# Patient Record
Sex: FEMALE | Race: WHITE | Employment: PART TIME | URBAN - METROPOLITAN AREA
[De-identification: names, ages, dates, MRNs, and addresses within clinical notes are randomized per-mention and may not be internally consistent; named-entity substitution may affect disease eponyms.]

---

## 2018-07-02 PROBLEM — G25.81 RLS (RESTLESS LEGS SYNDROME): Status: ACTIVE | Noted: 2018-07-02

## 2018-09-23 ENCOUNTER — APPOINTMENT (OUTPATIENT)
Dept: CT IMAGING | Age: 53
End: 2018-09-23
Attending: EMERGENCY MEDICINE
Payer: COMMERCIAL

## 2018-09-23 ENCOUNTER — HOSPITAL ENCOUNTER (EMERGENCY)
Age: 53
Discharge: HOME | End: 2018-09-23
Attending: EMERGENCY MEDICINE
Payer: COMMERCIAL

## 2018-09-23 VITALS
SYSTOLIC BLOOD PRESSURE: 138 MMHG | HEART RATE: 72 BPM | HEIGHT: 68 IN | WEIGHT: 170 LBS | OXYGEN SATURATION: 98 % | RESPIRATION RATE: 17 BRPM | DIASTOLIC BLOOD PRESSURE: 79 MMHG | TEMPERATURE: 98.2 F | BODY MASS INDEX: 25.76 KG/M2

## 2018-09-23 DIAGNOSIS — R51.9 ACUTE NONINTRACTABLE HEADACHE, UNSPECIFIED HEADACHE TYPE: Primary | ICD-10-CM

## 2018-09-23 PROCEDURE — 99283 EMERGENCY DEPT VISIT LOW MDM: CPT | Performed by: EMERGENCY MEDICINE

## 2018-09-23 PROCEDURE — 74011250637 HC RX REV CODE- 250/637: Performed by: EMERGENCY MEDICINE

## 2018-09-23 PROCEDURE — 70450 CT HEAD/BRAIN W/O DYE: CPT

## 2018-09-23 RX ORDER — HYDROCODONE BITARTRATE AND ACETAMINOPHEN 5; 325 MG/1; MG/1
1 TABLET ORAL ONCE
Status: COMPLETED | OUTPATIENT
Start: 2018-09-23 | End: 2018-09-23

## 2018-09-23 RX ADMIN — HYDROCODONE BITARTRATE AND ACETAMINOPHEN 1 TABLET: 5; 325 TABLET ORAL at 21:51

## 2018-09-24 NOTE — ED NOTES
I have reviewed discharge instructions with the patient and parent. The patient verbalized understanding. Patient left ED via Discharge Method: ambulatory to Home with family. Opportunity for questions and clarification provided. Patient given 0 scripts. To continue your aftercare when you leave the hospital, you may receive an automated call from our care team to check in on how you are doing. This is a free service and part of our promise to provide the best care and service to meet your aftercare needs.  If you have questions, or wish to unsubscribe from this service please call [phone]. Thank you for Choosing our [facility] Emergency Department.

## 2018-09-24 NOTE — DISCHARGE INSTRUCTIONS

## 2018-09-24 NOTE — ED PROVIDER NOTES
HPI Comments: Patient is a 42-year-old female with history of Crohn's disease who comes to the ER today complaining of a headache for the past 2 days. She denies any trauma or injury. She states it started out frontal headache. Then was moved to the back in the occipital area. She has had no relief with Tylenol. She denies any fever. She denies any numbness or weakness. Patient is a 47 y.o. female presenting with headaches. The history is provided by the patient. Headache This is a new problem. The current episode started 2 days ago. The problem occurs constantly. The problem has not changed since onset. The headache is aggravated by nothing. The pain is located in the frontal region. The quality of the pain is described as throbbing. The pain is moderate. Associated symptoms include malaise/fatigue. Pertinent negatives include no fever, no palpitations, no syncope, no shortness of breath, no weakness, no dizziness, no visual change, no nausea and no vomiting. Treatments tried: tylenol. The treatment provided no relief. Past Medical History:  
Diagnosis Date  Bronchitis, acute  COPD (chronic obstructive pulmonary disease) ([address] 10/1/2015  Crohn disease [facility] [address] [facility]  Gastrointestinal disorder   
 crohns disease  HTN (hypertension) 2/6/2014  
 controlled with meds  Hypothyroidism  Other ill-defined conditions(799.89) hypothyroid  Other ill-defined conditions(799.89) 02/16/2014  
 cellulitis of right arm/elbow from spider bite  Psychiatric disorder   
 depression, anxiety  Seizures ([address]   
 r/t low sodium  Thyroid disease Past Surgical History:  
Procedure Laterality Date  HX APPENDECTOMY  2008  HX BREAST REDUCTION Bilateral 1/20/2015 BREAST REDUCTION BILATERAL performed by Joan Matt [facility] at [facility] MAIN OR  
 HX CHOLECYSTECTOMY  HX HYSTERECTOMY  2008  HX ORTHOPAEDIC    
 l clavicle  HX SHOULDER ARTHROSCOPY  2010 Family History: Problem Relation Age of Onset  Heart Disease Mother  Cancer Father   
  bladder cancer 2010  
 Heart Disease Brother  Thyroid Disease Brother  Other Other Mother  questionable emphysema, smoker  Breast Cancer Neg Hx Social History Social History  Marital status:  Spouse name: N/A  
 Number of children: N/A  
 Years of education: N/A Occupational History  Not on file. Social History Main Topics  Smoking status: Former Smoker Packs/day: 1.00 Years: 30.00 Types: Cigarettes Quit date: 12/2/2017  Smokeless tobacco: Never Used  Alcohol use 0.0 oz/week  
  0 Standard drinks or equivalent per week Comment: occassional  
 Drug use: No  
 Sexual activity: Not Currently Other Topics Concern  Not on file Social History Narrative Exposure history: + Tobacco use, she has had parakeets, parrot and cockatiel in the past.  Used hot tubs frequently for a period of 5 years. She has not lived in an endemic regions. No definite TB or asbestos exposure. Occasional EtOH use. 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 ALLERGIES: Levaquin [levofloxacin] Review of Systems Constitutional: Positive for malaise/fatigue. Negative for fever. HENT: Negative. Eyes: Negative. Respiratory: Negative for shortness of breath. Cardiovascular: Negative for palpitations and syncope. Gastrointestinal: Negative for nausea and vomiting. Endocrine: Negative. Genitourinary: Negative. Musculoskeletal: Negative. Skin: Negative. Neurological: Positive for headaches. Negative for dizziness and weakness. Vitals:  
 09/23/18 2019 BP: 145/88 Pulse: 75 Resp: 16 Temp: 98.2 °F (36.8 °C) SpO2: 96% Weight: 77.1 kg (170 lb) Height: 5' 8\" (1.727 m) Physical Exam  
Constitutional: She is oriented to person, place, and time. She appears well-developed and well-nourished. HENT:  
Head: Normocephalic and atraumatic. Mouth/Throat: Oropharynx is clear and moist.  
Eyes: Conjunctivae and EOM are normal. Pupils are equal, round, and reactive to light. Neck: Normal range of motion. Neck supple. No meningeal signs Cardiovascular: Normal rate and regular rhythm. Pulmonary/Chest: Effort normal and breath sounds normal.  
Abdominal: Soft. There is no tenderness. Musculoskeletal: Normal range of motion. She exhibits no edema or tenderness. Lymphadenopathy:  
  She has no cervical adenopathy. Neurological: She is alert and oriented to person, place, and time. She has normal strength. No cranial nerve deficit or sensory deficit. GCS eye subscore is 4. GCS verbal subscore is 5. GCS motor subscore is 6. Skin: Skin is warm and dry. No rash noted. Psychiatric: Her mood appears anxious. Nursing note and vitals reviewed. MDM Number of Diagnoses or Management Options Diagnosis management comments: 9:55 PM 
CAT scan of the head is negative for any acute pathology. She is relieved with this negative scan. A dose of Norco is ordered. She will follow-up with her doctor. Amount and/or Complexity of Data Reviewed Tests in the radiology section of CPT®: ordered and reviewed Risk of Complications, Morbidity, and/or Mortality Presenting problems: moderate Diagnostic procedures: moderate Management options: low Patient Progress Patient progress: stable ED Course Procedures

## 2020-07-15 ENCOUNTER — HOSPITAL ENCOUNTER (OUTPATIENT)
Dept: PHYSICAL THERAPY | Age: 55
Discharge: HOME | End: 2020-07-15
Attending: INTERNAL MEDICINE
Payer: COMMERCIAL

## 2020-07-15 DIAGNOSIS — M54.89 BACK PAIN WITHOUT SCIATICA: ICD-10-CM

## 2020-07-15 PROCEDURE — 97161 PT EVAL LOW COMPLEX 20 MIN: CPT

## 2020-07-15 PROCEDURE — 97140 MANUAL THERAPY 1/> REGIONS: CPT

## 2020-07-15 NOTE — PROGRESS NOTES
Bhanu Handley  : 1965  Primary: Sita Crockett Essentials*  Secondary:  Therapy Center at [address] [place] [address]  [phone]   [phone]        OUTPATIENT PHYSICAL THERAPY: Daily Treatment Note 7/15/2020  Visit Count:  1    ICD-10: Treatment Diagnosis: Pain in left shoulder (M25.512); Pain in thoracic spine (M54.6)  Precautions/Allergies:   Levaquin [levofloxacin]   TREATMENT PLAN:  Effective Dates: 7/15/2020 TO 2020 (60 days). Frequency/Duration: 2 times a week for 60 Day(s) MEDICAL/REFERRING DIAGNOSIS:  Back pain without sciatica [M54.89]   DATE OF ONSET: 2020  REFERRING PHYSICIAN: Rocky Pak MD Orders: evaluate and treat  Return MD Appointment: not set       Pre-treatment Symptoms/Complaints:  Patient reports 6-8/10 pain at worst at L scapular border. Her pain worsens with work duties involving lifting, reaching, and carrying. She reports sitting and resting will alleviate pain. She denies doing any formal, or regular stretching/exercise for relief. Pain: Initial: Pain Intensity 1: 6  Pain Location 1: Shoulder, Back  Pain Orientation 1: Left  Post Session:  5/10   Medications Last Reviewed:  7/15/2020  Updated Objective Findings:  See evaluation note from today  TREATMENT:     THERAPEUTIC EXERCISE: (0 minutes):  Exercises per grid below to improve mobility and strength. Required minimal visual, verbal and manual cues to promote proper body alignment, promote proper body posture and promote proper body mechanics. Progressed complexity of movement as indicated. Date:   Date:   Date:     Activity/Exercise Parameters Parameters Parameters                                               MANUAL THERAPY: (15 minutes): Soft tissue mobilization was utilized and necessary because of the patient's restricted joint motion, painful spasm and restricted motion of soft tissue.    Patient received STM to medial scapular border, patient prone; central PA mobilizations T1-T8, 3x30 seconds each with grade II-III mobs, patient prone       Treatment/Session Summary:    · Response to Treatment:  Patient responded well to manual therapy today. Verbalized understanding of plan of care and HEP. · Communication/Consultation:  HEP verbal and written instruction, plan of care review  · Equipment provided today:  tennis ball for self-mobilization against wall   · Recommendations/Intent for next treatment session: Next visit will focus on HEP follow-up, manual therapy to scapula, .     Total Treatment Billable Duration:  45 minutes - 30 minutes initial evaluation, 15 minutes manual therapy   PT Patient Time In/Time Out  Time In: 0800  Time Out: 0850  Brisa Dunn, PT, DPT    Future Appointments   Date Time Provider Terry Vo   7/20/2020  8:00 AM Lincoln Payne, PT, DPT [facility]   7/22/2020  8:00 AM Lincoln Payne, PT, DPT [facility]   7/27/2020  8:00 AM Lincoln Payne, PT, DPT [facility]   7/29/2020  8:00 AM Lincoln Payne, PT, DPT [facility]   8/3/2020  8:00 AM Lincoln Payne, PT, DPT [facility]   8/5/2020  8:00 AM Lincoln Payne, PT, DPT [facility]   8/10/2020  8:00 AM Lincoln Payne, PT, DPT [facility]   8/12/2020  8:00 AM Lincoln Payne, PT, DPT [facility]

## 2020-07-15 NOTE — THERAPY EVALUATION
Tramaine Rivero  : 1965  Primary: Marylene Fallen Essentials*  Secondary:  Therapy Center at [address] [place] [address]  [phone]   [phone]          OUTPATIENT PHYSICAL THERAPY:Initial Assessment 7/15/2020   ICD-10: Treatment Diagnosis: Pain in left shoulder (M25.512); Pain in thoracic spine (M54.6)  Precautions/Allergies:   Levaquin [levofloxacin]   TREATMENT PLAN:  Effective Dates: 7/15/2020 TO 2020 (60 days). Frequency/Duration: 2 times a week for 60 Day(s) MEDICAL/REFERRING DIAGNOSIS:  Back pain without sciatica [M54.89]   DATE OF ONSET: 2020  REFERRING PHYSICIAN: Velia Mares MD Orders: evaluate and treat  Return MD Appointment: not set     INITIAL ASSESSMENT:  Ms. Shanice Oliva presents in therapy today with reports of medial border L scapula pain, which worsens during reaching, lifting, work duties. Signs and symptoms indicate a L rhomboid/levator strain. She will benefit from skilled PT in order to improve symptoms for optimum work and daily function. PROBLEM LIST (Impacting functional limitations):  1. Decreased Strength  2. Decreased ADL/Functional Activities  3. Increased Pain  4. Decreased Activity Tolerance  5. Decreased Flexibility/Joint Mobility  6. Decreased [place] with Home Exercise Program INTERVENTIONS PLANNED: (Treatment may consist of any combination of the following)  1. Cold  2. Electrical Stimulation  3. Heat  4. Home Exercise Program (HEP)  5. Manual Therapy  6. Range of Motion (ROM)  7. Therapeutic Activites  8. Therapeutic Exercise/Strengthening     GOALS: (Goals have been discussed and agreed upon with patient.)     SHORT-TERM FUNCTIONAL GOALS: Time Frame: 3 weeks  1. Patient will be compliant with HEP focused on upper extremity strengthening and ROM. 2.  Patient will rate L shoulder pain no greater than 3/10 for improved tolerance to daily and work duties.      DISCHARGE GOALS: Time Frame: 6 weeks  1. Patient will be independent with comprehensive HEP focused on continued performance of upper extremity strengthening and ROM activities. 2.  Patient will rate L shoulder pain no greater than 1/10 and which does not significantly interfere with daily or work duties for return to previous level of function. 3.  Patient will demonstrate near symmetrical bilateral UE AROM for optimum functional mobility with daily and work duties. 4.  Patient will demonstrate near symmetrical bilateral UE strength grades in the above tested planes for optimum performance and safety with daily and work duties. OUTCOME MEASURE:   Tool Used: Disabilities of the Arm, Shoulder and Hand (DASH) Questionnaire - Quick Version  Score:  Initial: 18/55  Most Recent: X/55 (Date: -- )   Interpretation of Score: The DASH is designed to measure the activities of daily living in person's with upper extremity dysfunction or pain. Each section is scored on a 1-5 scale, 5 representing the greatest disability. The scores of each section are added together for a total score of 55. MEDICAL NECESSITY:   · Patient is expected to demonstrate progress in strength and range of motion to improve safety during functional mobility and work duties. REASON FOR SERVICES/OTHER COMMENTS:  · Patient continues to require skilled intervention due to not reaching long term goals. Total Duration:  PT Patient Time In/Time Out  Time In: 0800  Time Out: 0850    Rehabilitation Potential For Stated Goals: Good  Regarding Bea Michaels's therapy, I certify that the treatment plan above will be carried out by a therapist or under their direction. Thank you for this referral,  Brian Gambino, PT, DPT     Referring Physician Signature: [facility] No Signature is Required for this note.      PAIN/SUBJECTIVE:   Initial: Pain Intensity 1: 6  Pain Location 1: Shoulder, Back  Pain Orientation 1: Left  Post Session:  5/10   HISTORY: History of Injury/Illness (Reason for Referral):  Patient reports insidious onset of L medial scapula pain, in June 2020. She denies any imaging, injections, or medical intervention. No known injury. Past Medical History/Comorbidities:   Ms. Jannette Vo  has a past medical history of Bronchitis, acute, COPD (chronic obstructive pulmonary disease) ([address] (10/1/2015), Crohn disease ([address] Gastrointestinal disorder, HTN (hypertension) (2/6/2014), Hypothyroidism, Other ill-defined conditions(799.89), Other ill-defined conditions(799.89) (02/16/2014), Psychiatric disorder, Seizures ([address] and Thyroid disease. She also has no past medical history of Aneurysm ([address] Arrhythmia, Arthritis, Asthma, Autoimmune disease ([address] CAD (coronary artery disease), Cancer ([address] Chronic kidney disease, Chronic pain, Coagulation disorder ([address] Diabetes ([address] Difficult intubation, GERD (gastroesophageal reflux disease), Heart failure ([address] Liver disease, Malignant hyperthermia due to anesthesia, Morbid obesity ([address] Nausea & vomiting, Pseudocholinesterase deficiency, PUD (peptic ulcer disease), Stroke ([address] Thromboembolus ([address] Unspecified adverse effect of anesthesia, or Unspecified sleep apnea. Ms. Jannette Vo  has a past surgical history that includes hx hysterectomy (2008); hx cholecystectomy; hx appendectomy (2008); hx orthopaedic; hx shoulder arthroscopy (2010); and hx breast reduction (Bilateral, 1/20/2015). 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  [facility] Patent [id number]. Federal Law prohibits the replication, distribution or use without written permission from [facility]   Current Medications:       Current Outpatient Medications:     tiZANidine (ZANAFLEX) 4 mg tablet, Take 1 Tab by mouth two (2) times a day. prn, Disp: 60 Tab, Rfl: 1    pantoprazole (PROTONIX) 40 mg tablet, TAKE ONE TABLET BY MOUTH ONE TIME DAILY, Disp: 90 Tab, Rfl: 2    levothyroxine (SYNTHROID) 112 mcg tablet, TAKE ONE TABLET BY MOUTH ONE TIME DAILY BEFORE BREAKFAST, Disp: 90 Tab, Rfl: 2    miconazole (MONISTAT 7) 2 % vaginal cream, Insert 1 Applicator into vagina nightly., Disp: 45 g, Rfl: 0    ciprofloxacin HCl (CILOXAN) 0.3 % ophthalmic solution, Administer 1 Drop to both eyes every two (2) hours. , Disp: 10 mL, Rfl: 1    buPROPion XL (WELLBUTRIN XL) 150 mg tablet, Take 150 mg by mouth daily. , Disp: , Rfl:     amLODIPine (NORVASC) 5 mg tablet, TAKE ONE TABLET BY MOUTH ONE TIME DAILY, Disp: 90 Tab, Rfl: 3    ergocalciferol (ERGOCALCIFEROL) 1,250 mcg (50,000 unit) capsule, Take 1 Cap by mouth every seven (7) days. , Disp: 12 Cap, Rfl: 3    estradioL (VIVELLE) 0.05 mg/24 hr, APPLY ONE PATCH TO THE SKIN TWICE A WEEK, Disp: 24 Patch, Rfl: 1    estradioL (VIVELLE) 0.1 mg/24 hr, APPLY 1 PATCH TRANSDERMALLY TWO TIMES A WEEK, Disp: 24 Patch, Rfl: 1    tolterodine ER (DETROL LA) 4 mg ER capsule, TAKE ONE CAPSULE BY MOUTH ONE TIME DAILY, Disp: 90 Cap, Rfl: 3    topiramate (TOPAMAX) 50 mg tablet, Take 1 Tab by mouth two (2) times a day., Disp: 180 Tab, Rfl: 3    lamoTRIgine (LAMICTAL) 200 mg tablet, Take 1 Tab by mouth two (2) times a day., Disp: 180 Tab, Rfl: 3    LATUDA 60 mg tab tablet, Take 1 Tab by mouth nightly., Disp: 90 Tab, Rfl: 3    vedolizumab (ENTYVIO) 300 mg injection, 300 mg by IntraVENous route once. Indications: Crohn's Disease, every 8 weeks, Disp: , Rfl:     escitalopram oxalate (LEXAPRO) 10 mg tablet, Take 10 mg by mouth daily. , Disp: , Rfl:    LORazepam (ATIVAN) 2 mg tablet, Take  by mouth every six (6) hours as needed for Anxiety. , Disp: , Rfl:     dicyclomine (BENTYL) 20 mg tablet, Take 20 mg by mouth every six (6) hours. , Disp: , Rfl:     azaTHIOprine (IMURAN) 50 mg tablet, Take 50 mg by mouth daily. , Disp: , Rfl:     B complx-C-folic-zinc-copper-E (STRESS B-COMPLEX) 500 mg-400 mcg- 24 mg-3 mg tab, Take  by mouth., Disp: , Rfl:     suvorexant (BELSOMRA) 15 mg tablet, Take  by mouth nightly as needed for Insomnia., Disp: , Rfl:    Date Last Reviewed:  7/15/2020     Number of Personal Factors/Comorbidities that affect the Plan of Care: 0: LOW COMPLEXITY   EXAMINATION:   Observation/Orthostatic Postural Assessment:          Patient appears in healthy condition, bilaterally rounded shoulders with forward head and increased thoracic kyphosis   Palpation:          Palpable tightness and muscle adhesions present along medial border of L scapula; thoracic mobility is hypomobile T2-7 with central PA mobilizations   ROM:          Cervical AROM is WNL; L shoulder AROM is WNL compared to R, however scapular rhythm with abduction appears hypomobile compared to R  Strength:          L shoulder flexion and abduction 4/5; R shoulder flexion and abduction 4+/5  Special Tests:          Negative IR resistance test, negative impingement testing   Body Structures Involved:  1. Bones  2. Joints  3. Muscles Body Functions Affected:  1. Sensory/Pain  2. Movement Related Activities and Participation Affected:  1. General Tasks and Demands  2. Mobility  3. Domestic Life  4. Interpersonal Interactions and Relationships  5.  Community, Social and [address]   Number of elements (examined above) that affect the Plan of Care: 1-2: LOW COMPLEXITY   CLINICAL PRESENTATION:   Presentation: Stable and uncomplicated: LOW COMPLEXITY   CLINICAL DECISION MAKING:   Use of outcome tool(s) and clinical judgement create a POC that gives a: Clear prediction of patient's progress: LOW COMPLEXITY

## 2020-07-20 ENCOUNTER — HOSPITAL ENCOUNTER (OUTPATIENT)
Dept: PHYSICAL THERAPY | Age: 55
Discharge: HOME | End: 2020-07-20
Attending: INTERNAL MEDICINE
Payer: COMMERCIAL

## 2020-07-20 PROCEDURE — 97110 THERAPEUTIC EXERCISES: CPT

## 2020-07-20 PROCEDURE — 97140 MANUAL THERAPY 1/> REGIONS: CPT

## 2020-07-20 NOTE — PROGRESS NOTES
Gloria Malin  : 1965  Primary: Stephanie Welsh Essentials*  Secondary:  Therapy Center at [address] [place] [address]  [phone]   [phone]        OUTPATIENT PHYSICAL THERAPY: Daily Treatment Note 2020  Visit Count:  2    ICD-10: Treatment Diagnosis: Pain in left shoulder (M25.512); Pain in thoracic spine (M54.6)  Precautions/Allergies:   Levaquin [levofloxacin]   TREATMENT PLAN:  Effective Dates: 7/15/2020 TO 2020 (60 days). Frequency/Duration: 2 times a week for 60 Day(s) MEDICAL/REFERRING DIAGNOSIS:  Back pain without sciatica [M54.89]   DATE OF ONSET: 2020  REFERRING PHYSICIAN: Mike Tinajero MD Orders: evaluate and treat  Return MD Appointment: not set       Pre-treatment Symptoms/Complaints:  Patient reports \"the pain is still there, but better. It's definitely not as agonizing at work. \"    Pain: Initial: Pain Intensity 1: 5  Pain Location 1: Shoulder, Back  Pain Orientation 1: Left  Post Session:  4/10   Medications Last Reviewed:  2020  Updated Objective Findings:  None Today  TREATMENT:     THERAPEUTIC EXERCISE: (10 minutes):  Exercises per grid below to improve mobility and strength. Required minimal visual, verbal and manual cues to promote proper body alignment, promote proper body posture and promote proper body mechanics. Progressed complexity of movement as indicated. Date:  20 Date:   Date:     Activity/Exercise Parameters Parameters Parameters   Angry cat stretch   Quadriped   5 sec hold  x10     Thoracic lateral flexion    Seated   To R, opening L  5 sec hold  x10                                        MANUAL THERAPY: (30 minutes): Soft tissue mobilization was utilized and necessary because of the patient's restricted joint motion, painful spasm and restricted motion of soft tissue.    Patient received STM to medial scapular border, patient prone; central PA mobilizations T1-T8, 3x30 seconds each with grade II-III mobs, patient prone   Patient received L lateral costal mobilizations, gentle grades I-II, patient prone   Patient received kinesio jovanna taping to L distal thoracic cage, anchored just L of T10, with 5 tails spreading across L thoracic cage, 20% tension, patient instructed to remove tape if uncomfortable      Treatment/Session Summary:    · Response to Treatment:  Patient responded well to manual therapy today, and is progressing well overall. · Communication/Consultation:  HEP verbal and written instruction, plan of care review  · Equipment provided today:  tennis ball for self-mobilization against wall   · Recommendations/Intent for next treatment session: Next visit will focus on HEP follow-up, manual therapy to scapula, .     Total Treatment Billable Duration:  40 minutes - 10 minutes therapeutic exercise, 30 minutes manual therapy   PT Patient Time In/Time Out  Time In: 0800  Time Out: 0845  Darinel Watkins, PT, DPT    Future Appointments   Date Time Provider Terry Vo   7/22/2020  8:00 AM Ingrid Sinks, PT, DPT [facility]   7/27/2020  8:00 AM Ingrid Sinks, PT, DPT [facility]   7/29/2020  8:00 AM Ingrid Sinks, PT, DPT [facility]   8/3/2020  8:00 AM Ingrid Sinks, PT, DPT [facility]   8/5/2020  8:00 AM Ingrid Sinks, PT, DPT [facility]   8/10/2020  8:00 AM Ingrid Sinks, PT, DPT [facility]   8/12/2020  8:00 AM Ingrid Sinks, PT, DPT [facility]

## 2020-07-22 ENCOUNTER — HOSPITAL ENCOUNTER (OUTPATIENT)
Dept: PHYSICAL THERAPY | Age: 55
Discharge: HOME | End: 2020-07-22
Attending: INTERNAL MEDICINE
Payer: COMMERCIAL

## 2020-07-22 PROCEDURE — 97140 MANUAL THERAPY 1/> REGIONS: CPT

## 2020-07-22 PROCEDURE — 97110 THERAPEUTIC EXERCISES: CPT

## 2020-07-22 NOTE — PROGRESS NOTES
Zaheer Proud  : 1965  Primary: [facility]*  Secondary:  [facility] at [address] [place] [address]  [phone]   [phone]        OUTPATIENT PHYSICAL THERAPY: Daily Treatment Note 2020  Visit Count:  3    ICD-10: Treatment Diagnosis: Pain in left shoulder (M25.512); Pain in thoracic spine (M54.6)  Precautions/Allergies:   Levaquin [levofloxacin]   TREATMENT PLAN:  Effective Dates: 7/15/2020 TO 2020 (60 days). Frequency/Duration: 2 times a week for 60 Day(s) MEDICAL/REFERRING DIAGNOSIS:  Back pain without sciatica [M54.89]   DATE OF ONSET: 2020  REFERRING PHYSICIAN: Zachariah Cha MD Orders: evaluate and treat  Return MD Appointment: not set       Pre-treatment Symptoms/Complaints:  Patient reports that she feels ok currently, but Monday she had a big project at work, which entailed repetitive bending and arranging of shoes on a table. She did not notice much difference with the kinesio taping. Pain: Initial: Pain Intensity 1: 4  Pain Location 1: Shoulder, Back  Pain Orientation 1: Left  Post Session:  4/10   Medications Last Reviewed:  2020  Updated Objective Findings:  None Today  TREATMENT:     THERAPEUTIC EXERCISE: (10 minutes):  Exercises per grid below to improve mobility and strength. Required minimal visual, verbal and manual cues to promote proper body alignment, promote proper body posture and promote proper body mechanics. Progressed complexity of movement as indicated. Date:  20 Date:  20 Date:     Activity/Exercise Parameters Parameters Parameters   Angry cat stretch   Quadriped   5 sec hold  x10     Thoracic lateral flexion    Seated   To R, opening L  5 sec hold  x10       UBE    8 minutes  Level 1                              MANUAL THERAPY: (30 minutes):  Soft tissue mobilization was utilized and necessary because of the patient's restricted joint motion, painful spasm and restricted motion of soft tissue. Patient received STM to medial scapular border, patient prone; central PA mobilizations T1-T8, 3x30 seconds each with grade II-III mobs, patient prone   Patient received L lateral costal mobilizations, gentle grades I-II, patient prone   Patient received kinesio jovanna taping to L distal thoracic cage, anchored just L of T10, with 5 tails spreading across L thoracic cage, 20% tension, patient instructed to remove tape if uncomfortable (not today)      Treatment/Session Summary:    · Response to Treatment:  Patient responded well to manual therapy today. I added a gentle thoracic extension to perform throughout the day, and we also reviewed proper body mechanics during her work duties, especially promoting staggered stance during standing work activities to prevent excessive kyphosis/forward bend at work. · Communication/Consultation:  HEP verbal and written instruction, plan of care review  · Equipment provided today:  tennis ball for self-mobilization against wall   · Recommendations/Intent for next treatment session: Next visit will focus on HEP follow-up, manual therapy to scapula, .     Total Treatment Billable Duration:  40 minutes - 10 minutes therapeutic exercise, 30 minutes manual therapy   PT Patient Time In/Time Out  Time In: 0800  Time Out: 0845  Ysabel Fraga, PT, DPT    Future Appointments   Date Time Provider Terry Vo   7/27/2020  8:00 AM Brian Ndiaye, PT, DPT [facility] [place]   7/29/2020  8:00 AM Brian Ndiaye PT, DPT [facility] [place]   8/3/2020  8:00 AM Brian Ndiaye PT, DPT [facility] [place]   8/5/2020  8:00 AM Brian Ndiaye, PT, DPT [facility] [place]   8/10/2020  8:00 AM Brian Ndiaye, PT, DPT [facility] [place]   8/12/2020  8:00 AM Brian Ndiaye, PT, DPT [facility] [place]

## 2020-07-27 ENCOUNTER — HOSPITAL ENCOUNTER (OUTPATIENT)
Dept: PHYSICAL THERAPY | Age: 55
Discharge: HOME | End: 2020-07-27
Attending: INTERNAL MEDICINE
Payer: COMMERCIAL

## 2020-07-27 PROCEDURE — 97014 ELECTRIC STIMULATION THERAPY: CPT

## 2020-07-27 PROCEDURE — 97110 THERAPEUTIC EXERCISES: CPT

## 2020-07-27 NOTE — PROGRESS NOTES
Eric Simmsashley  : 1965  Primary: [facility] [employer]*  Secondary:  [facility] at [address] [place] [address]  [phone]   [phone]        OUTPATIENT PHYSICAL THERAPY: Daily Treatment Note 2020  Visit Count:  4    ICD-10: Treatment Diagnosis: Pain in left shoulder (M25.512); Pain in thoracic spine (M54.6)  Precautions/Allergies:   Levaquin [levofloxacin]   TREATMENT PLAN:  Effective Dates: 7/15/2020 TO 2020 (60 days). Frequency/Duration: 2 times a week for 60 Day(s) MEDICAL/REFERRING DIAGNOSIS:  Back pain without sciatica [M54.89]   DATE OF ONSET: 2020  REFERRING PHYSICIAN: Nathanael Max MD Orders: evaluate and treat  Return MD Appointment: not set       Pre-treatment Symptoms/Complaints:  Patient reports that lats night the left sided back/thoracic cage pain bothered her, but overall \"its definitely better than when I started therapy. It no longer makes me leave work early. \"    Pain: Initial: Pain Intensity 1: 3  Pain Location 1: Shoulder, Back  Pain Orientation 1: Left  Post Session:  2-3/10   Medications Last Reviewed:  2020  Updated Objective Findings:  None Today  TREATMENT:     THERAPEUTIC EXERCISE: (25 minutes):  Exercises per grid below to improve mobility and strength. Required minimal visual, verbal and manual cues to promote proper body alignment, promote proper body posture and promote proper body mechanics. Progressed complexity of movement as indicated. Date:  20 Date:  20 Date:  20   Activity/Exercise Parameters Parameters Parameters   Angry cat stretch   Quadriped   5 sec hold  x10     Thoracic lateral flexion    Seated   To R, opening L  5 sec hold  x10       UBE    8 minutes  Level 1 8 minutes  Level 1   Thoracic rotation     Double green band  x10 each direction  Standing                        MANUAL THERAPY: (0 minutes):  Soft tissue mobilization was utilized and necessary because of the patient's restricted joint motion, painful spasm and restricted motion of soft tissue. Patient received STM to medial scapular border, patient prone; central PA mobilizations T1-T8, 3x30 seconds each with grade II-III mobs, patient prone   Patient received L lateral costal mobilizations, gentle grades I-II, patient prone   Patient received kinesio jovanna taping to L distal thoracic cage, anchored just L of T10, with 5 tails spreading across L thoracic cage, 20% tension, patient instructed to remove tape if uncomfortable (not today)     Patient received IFC electrical stimulation, intersecting at L sided thoracic cage at level of T10, intensity set to patient tolerance and adjusted throughout session, patient prone; skin clear after treatment and patient reported improved pain after this intervention. 15 minutes      Treatment/Session Summary:    · Response to Treatment:  Patient did well today, with another review of proper body mechanics. She appears to be progressing well at this time, reporting less pain and improved function with work duties. · Communication/Consultation:  HEP verbal and written instruction, plan of care review  · Equipment provided today:  tennis ball for self-mobilization against wall   · Recommendations/Intent for next treatment session: Next visit will focus on HEP follow-up, manual therapy to scapula, .     Total Treatment Billable Duration:  40 minutes - 25 minutes therapeutic exercise, 15 minutes electrical stimulation unattended  PT Patient Time In/Time Out  Time In: 0800  Time Out: 0845  Karlos Tijerina PT, DPT    Future Appointments   Date Time Provider Terry Vo   7/29/2020  8:00 AM Francis Osborne, PT, DPT [facility]   8/3/2020  8:00 AM Francis Osborne PT, DPT [facility]   8/5/2020  8:00 AM Francis Osborne PT, DPT [facility]   8/10/2020  8:00 AM Francis Osborne PT, DPT [facility]   8/12/2020  8:00 AM Francis Osborne PT, DPT [facility]

## 2020-07-29 ENCOUNTER — HOSPITAL ENCOUNTER (OUTPATIENT)
Dept: PHYSICAL THERAPY | Age: 55
Discharge: HOME | End: 2020-07-29
Attending: INTERNAL MEDICINE
Payer: COMMERCIAL

## 2020-07-29 PROCEDURE — 97110 THERAPEUTIC EXERCISES: CPT

## 2020-07-29 PROCEDURE — 97014 ELECTRIC STIMULATION THERAPY: CPT

## 2020-07-29 NOTE — PROGRESS NOTES
Keyshawn Junior  : 1965  Primary: [facility] [employer]*  Secondary:  [facility] at [address] [place] [address]  [phone]   [phone]        OUTPATIENT PHYSICAL THERAPY: Daily Treatment Note 2020  Visit Count:  5    ICD-10: Treatment Diagnosis: Pain in left shoulder (M25.512); Pain in thoracic spine (M54.6)  Precautions/Allergies:   Levaquin [levofloxacin]   TREATMENT PLAN:  Effective Dates: 7/15/2020 TO 2020 (60 days). Frequency/Duration: 2 times a week for 60 Day(s) MEDICAL/REFERRING DIAGNOSIS:  Back pain without sciatica [M54.89]   DATE OF ONSET: 2020  REFERRING PHYSICIAN: Callie Thakkar MD Orders: evaluate and treat  Return MD Appointment: not set       Pre-treatment Symptoms/Complaints:  Patient reports that the electrical stimulation helped a lot and \"I had a whole day without any pain. \"    Pain: Initial: Pain Intensity 1: 3  Pain Location 1: Shoulder, Back  Pain Orientation 1: Left  Post Session:  1/10   Medications Last Reviewed:  2020  Updated Objective Findings:  None Today  TREATMENT:     THERAPEUTIC EXERCISE: (25 minutes):  Exercises per grid below to improve mobility and strength. Required minimal visual, verbal and manual cues to promote proper body alignment, promote proper body posture and promote proper body mechanics. Progressed complexity of movement as indicated. Date:  20 Date:  20 Date:  20   Activity/Exercise Parameters Parameters Parameters   Angry cat stretch   Quadriped   5 sec hold  x10     Thoracic lateral flexion    Seated   To R, opening L  5 sec hold  x10       UBE    8 minutes  Level 1 8 minutes  Level 1   Thoracic rotation     Double green band  x10 each direction  Standing                        MANUAL THERAPY: (0 minutes):  Soft tissue mobilization was utilized and necessary because of the patient's restricted joint motion, painful spasm and restricted motion of soft tissue. Patient received STM to medial scapular border, patient prone; central PA mobilizations T1-T8, 3x30 seconds each with grade II-III mobs, patient prone   Patient received L lateral costal mobilizations, gentle grades I-II, patient prone   Patient received kinesio jovanna taping to L distal thoracic cage, anchored just L of T10, with 5 tails spreading across L thoracic cage, 20% tension, patient instructed to remove tape if uncomfortable (not today)     Patient received IFC electrical stimulation, intersecting at L sided thoracic cage at level of T10, intensity set to patient tolerance and adjusted throughout session, patient prone; skin clear after treatment and patient reported improved pain after this intervention. 15 minutes      Treatment/Session Summary:    · Response to Treatment:  Patient is currently progressing very well, showing very little symptoms over the past 2 days. · Communication/Consultation:  HEP verbal and written instruction, plan of care review  · Equipment provided today:  tennis ball for self-mobilization against wall   · Recommendations/Intent for next treatment session: Next visit will focus on HEP follow-up, manual therapy to scapula, .     Total Treatment Billable Duration:  40 minutes - 25 minutes therapeutic exercise, 15 minutes electrical stimulation unattended  PT Patient Time In/Time Out  Time In: 0800  Time Out: 0845  Tanmay Delcid PT, DPT    Future Appointments   Date Time Provider Terry Vo   8/3/2020  8:00 AM Herman Najjar, PT, DPT [facility]   8/5/2020  8:00 AM Herman Najjar, PT, DPT [facility]   8/10/2020  8:00 AM Herman Najjar, PT, DPT [facility]   8/12/2020  8:00 AM Herman Najjar, PT, DPT [facility]

## 2020-08-03 ENCOUNTER — APPOINTMENT (OUTPATIENT)
Dept: PHYSICAL THERAPY | Age: 55
End: 2020-08-03
Attending: INTERNAL MEDICINE
Payer: COMMERCIAL

## 2020-08-05 ENCOUNTER — APPOINTMENT (OUTPATIENT)
Dept: PHYSICAL THERAPY | Age: 55
End: 2020-08-05
Attending: INTERNAL MEDICINE
Payer: COMMERCIAL

## 2020-08-06 ENCOUNTER — HOSPITAL ENCOUNTER (OUTPATIENT)
Dept: PHYSICAL THERAPY | Age: 55
Discharge: HOME | End: 2020-08-06
Attending: INTERNAL MEDICINE
Payer: COMMERCIAL

## 2020-08-06 PROCEDURE — 97110 THERAPEUTIC EXERCISES: CPT

## 2020-08-06 PROCEDURE — 97014 ELECTRIC STIMULATION THERAPY: CPT

## 2020-08-06 NOTE — PROGRESS NOTES
Micah Alvarado  : 1965  Primary: [facility]*  Secondary:  [facility] at [address] [place] [address]  [phone]   [phone]        OUTPATIENT PHYSICAL THERAPY: Daily Treatment Note 2020  Visit Count:  6    ICD-10: Treatment Diagnosis: Pain in left shoulder (M25.512); Pain in thoracic spine (M54.6)  Precautions/Allergies:   Levaquin [levofloxacin]   TREATMENT PLAN:  Effective Dates: 7/15/2020 TO 2020 (60 days). Frequency/Duration: 2 times a week for 60 Day(s) MEDICAL/REFERRING DIAGNOSIS:  Back pain without sciatica [M54.89]   DATE OF ONSET: 2020  REFERRING PHYSICIAN: Boom Ortega MD Orders: evaluate and treat  Return MD Appointment: not set       Pre-treatment Symptoms/Complaints:  Patient had to reschedule her visit to today at a later time, so visit had to be shortened due to her work schedule. Pain: Initial: Pain Intensity 1: 1  Pain Location 1: Back  Pain Orientation 1: Left  Post Session:  1/10   Medications Last Reviewed:  2020  Updated Objective Findings:  None Today  TREATMENT:     THERAPEUTIC EXERCISE: (15 minutes):  Exercises per grid below to improve mobility and strength. Required minimal visual, verbal and manual cues to promote proper body alignment, promote proper body posture and promote proper body mechanics. Progressed complexity of movement as indicated. Date:  20 Date:  20 Date:  20   Activity/Exercise Parameters Parameters Parameters   Angry cat stretch   Quadriped   5 sec hold  x10     Thoracic lateral flexion    Seated   To R, opening L  5 sec hold  x10       UBE    8 minutes  Level 1 8 minutes  Level 1   Thoracic rotation     Double green band  x10 each direction  Standing                        MANUAL THERAPY: (0 minutes):  Soft tissue mobilization was utilized and necessary because of the patient's restricted joint motion, painful spasm and restricted motion of soft tissue. Patient received STM to medial scapular border, patient prone; central PA mobilizations T1-T8, 3x30 seconds each with grade II-III mobs, patient prone   Patient received L lateral costal mobilizations, gentle grades I-II, patient prone   Patient received kinesio jovanna taping to L distal thoracic cage, anchored just L of T10, with 5 tails spreading across L thoracic cage, 20% tension, patient instructed to remove tape if uncomfortable (not today)     Patient received IFC electrical stimulation, intersecting at L sided thoracic cage at level of T10, intensity set to patient tolerance and adjusted throughout session, patient prone; skin clear after treatment and patient reported improved pain after this intervention. 15  minutes      Treatment/Session Summary:    · Response to Treatment:  Patient is currently progressing very well, showing very little symptoms over the past 2 days. · Communication/Consultation:  HEP verbal and written instruction, plan of care review  · Equipment provided today:  tennis ball for self-mobilization against wall   · Recommendations/Intent for next treatment session: Next visit will focus on HEP follow-up, manual therapy to scapula, .     Total Treatment Billable Duration:  30 minutes - 15 minutes therapeutic exercise, 15 minutes electrical stimulation unattended  PT Patient Time In/Time Out  Time In: 0845  Time Out: 0915  Ysabel Fraga, PT, DPT    Future Appointments   Date Time Provider Terry Vo   8/10/2020  8:00 AM Brian Ndiaye, PT, DPT [facility]   8/12/2020  8:00 AM Brian Ndiaye PT, DPT [facility]

## 2020-08-10 ENCOUNTER — APPOINTMENT (OUTPATIENT)
Dept: PHYSICAL THERAPY | Age: 55
End: 2020-08-10
Attending: INTERNAL MEDICINE
Payer: COMMERCIAL

## 2020-08-11 ENCOUNTER — APPOINTMENT (OUTPATIENT)
Dept: PHYSICAL THERAPY | Age: 55
End: 2020-08-11
Attending: INTERNAL MEDICINE
Payer: COMMERCIAL

## 2020-08-17 ENCOUNTER — APPOINTMENT (OUTPATIENT)
Dept: PHYSICAL THERAPY | Age: 55
End: 2020-08-17
Attending: INTERNAL MEDICINE
Payer: COMMERCIAL

## 2020-09-29 ENCOUNTER — HOSPITAL ENCOUNTER (EMERGENCY)
Age: 55
Discharge: HOME | End: 2020-09-29
Attending: EMERGENCY MEDICINE
Payer: COMMERCIAL

## 2020-09-29 ENCOUNTER — APPOINTMENT (OUTPATIENT)
Dept: CT IMAGING | Age: 55
End: 2020-09-29
Attending: EMERGENCY MEDICINE
Payer: COMMERCIAL

## 2020-09-29 VITALS
WEIGHT: 170 LBS | TEMPERATURE: 98.1 F | BODY MASS INDEX: 25.76 KG/M2 | RESPIRATION RATE: 18 BRPM | HEIGHT: 68 IN | HEART RATE: 84 BPM | DIASTOLIC BLOOD PRESSURE: 67 MMHG | SYSTOLIC BLOOD PRESSURE: 134 MMHG | OXYGEN SATURATION: 98 %

## 2020-09-29 DIAGNOSIS — S06.0X1A CEREBRAL CONCUSSION, WITH LOSS OF CONSCIOUSNESS OF 30 MINUTES OR LESS, INITIAL ENCOUNTER: ICD-10-CM

## 2020-09-29 DIAGNOSIS — G44.319 ACUTE POST-TRAUMATIC HEADACHE, NOT INTRACTABLE: ICD-10-CM

## 2020-09-29 DIAGNOSIS — S01.81XA FACIAL LACERATION, INITIAL ENCOUNTER: Primary | ICD-10-CM

## 2020-09-29 PROCEDURE — 70450 CT HEAD/BRAIN W/O DYE: CPT

## 2020-09-29 PROCEDURE — 75810000293 HC SIMP/SUPERF WND  RPR

## 2020-09-29 PROCEDURE — 99283 EMERGENCY DEPT VISIT LOW MDM: CPT

## 2020-09-29 RX ORDER — BUTALBITAL, ACETAMINOPHEN AND CAFFEINE 300; 40; 50 MG/1; MG/1; MG/1
1-2 CAPSULE ORAL
Qty: 20 CAP | Refills: 0 | Status: SHIPPED | OUTPATIENT
Start: 2020-09-29

## 2020-09-29 NOTE — DISCHARGE INSTRUCTIONS
Alternate 4 ibuprofen every 8 hours with 2 extra-strength tylenol every 6 hours  fioricet as needed for headache, however do NOT take these along with any sleeping pills such as belsomra    Antibiotic ointment of choice for three days  Keep stitches in for 5-6 days  May wash gently, but do NOT soak or submerge. Keep clean and dry with bandage  Return if worse in any way  Follow up with arturo Beaulieu,    Patient Education        Concussion: Care Instructions  Your Care Instructions     A concussion is a kind of injury to the brain. It happens when the head receives a hard blow. The impact can jar or shake the brain against the skull. This interrupts the brain's normal activities. Although you may have cuts or bruises on your head or face, you may have no other visible signs of a brain injury. In most cases, damage to the brain from a concussion can't be seen in tests such as a CT or MRI scan. For a few weeks, you may have low energy, dizziness, trouble sleeping, a headache, ringing in your ears, or nausea. You may also feel anxious, grumpy, or depressed. You may have problems with memory and concentration. These symptoms are common after a concussion. They should slowly improve over time. Sometimes this takes weeks or even months. Someone who lives with you should know how to care for you. Please share this and all information with a caregiver who will be available to help if needed. Follow-up care is a key part of your treatment and safety. Be sure to make and go to all appointments, and call your doctor if you are having problems. It's also a good idea to know your test results and keep a list of the medicines you take. How can you care for yourself at home? Pain control  · Put ice or a cold pack on the part of your head that hurts for 10 to 20 minutes at a time. Put a thin cloth between the ice and your skin. · Be safe with medicines. Read and follow all instructions on the label.   ? If the doctor gave you a prescription medicine for pain, take it as prescribed. ? If you are not taking a prescription pain medicine, ask your doctor if you can take an over-the-counter medicine. Recovery  · Follow your doctor's instructions. He or she will tell you if you need someone to watch you closely for the next 24 hours or longer. · Rest is the best way to recover from a concussion. You need to rest your body and your brain:  ? Get plenty of sleep at night. And take rest breaks during the day. ? Avoid activities that take a lot of physical or mental work. This includes housework, exercise, schoolwork, video games, text messaging, and using the computer. ? You may need to change your school or work schedule while you recover. ? Return to your normal activities slowly. Do not try to do too much at once. · Do not drink alcohol or use illegal drugs. Alcohol and illegal drugs can slow your recovery. And they can increase your risk of a second brain injury. · Avoid activities that could lead to another concussion. Follow your doctor's instructions for a gradual return to activity and sports. · Ask your doctor when it's okay for you to drive a car, ride a bike, or operate machinery. How should you return to activity? Your return to activity can begin after 1 to 2 days of physical and mental rest. After resting, you can gradually increase your activity as long as it does not cause new symptoms or worsen your symptoms. Doctors and concussion specialists suggest steps to follow for returning to sports after a concussion. Use these steps as a guide. You should slowly progress through the following levels of activity:  1. Limited activity. You can take part in daily activities as long as the activity doesn't increase your symptoms or cause new symptoms. 2. Light aerobic activity. This can include walking, swimming, or other exercise at less than 70% of maximum heart rate.  No resistance training is included in this step.  3. Sport-specific exercise. This includes running drills or skating drills (depending on the sport), but no head impact. 4. Noncontact training drills. This includes more complex training drills such as passing. The athlete may also begin light resistance training. 5. Full-contact practice. The athlete can participate in normal training. 6. Return to normal game play. This is the final step and allows the athlete to join in normal game play. Watch and keep track of your progress. It should take at least 6 days for you to go from light activity to normal game play. Make sure that you can stay at each new level of activity for at least 24 hours without symptoms, or as long as your doctor says, before doing more. If one or more symptoms come back, return to a lower level of activity for at least 24 hours. Don't move on until all symptoms are gone. When should you call for help? Call 911 anytime you think you may need emergency care. For example, call if:    · You have a seizure.     · You passed out (lost consciousness).     · You are confused or can't stay awake. Call your doctor now or seek immediate medical care if:    · You have new or worse vomiting.     · You feel less alert.     · You have new weakness or numbness in any part of your body. Watch closely for changes in your health, and be sure to contact your doctor if:    · You do not get better as expected.     · You have new symptoms, such as headaches, trouble concentrating, or changes in mood. Where can you learn more? Go to http://gregory-bob.info/  Enter J590 in the search box to learn more about \"Concussion: Care Instructions. \"  Current as of: November 20, 2019               Content Version: 12.6  © 6507-9507 [employer]. Care instructions adapted under license by [employer] (which disclaims liability or warranty for this information).  If you have questions about a medical condition or this instruction, always ask your healthcare professional. [address] any warranty or liability for your use of this information. Patient Education        Cuts on the Face Closed With Stitches: Care Instructions  Your Care Instructions  A cut on your face can be on your chin, cheek, nose, forehead, eyelid, lip, or ear. The doctor used stitches to close the cut. Using stitches helps the cut heal and reduces scarring. The doctor may also have called in a specialist, such as a plastic surgeon, to close the cut. If the cut went deep and through the skin, the doctor may have put in two layers of stitches. The deeper layer brings the deep part of the cut together. These stitches will dissolve and don't need to be removed. The stitches in the upper layer are the ones you see on the cut. You will probably have a bandage. You will need to have the stitches removed, usually in 3 to 5 days. The doctor has checked you carefully, but problems can develop later. If you notice any problems or new symptoms, get medical treatment right away. Follow-up care is a key part of your treatment and safety. Be sure to make and go to all appointments, and call your doctor if you are having problems. It's also a good idea to know your test results and keep a list of the medicines you take. How can you care for yourself at home? · Keep the cut dry for the first 24 to 48 hours. After this, you can shower if your doctor okays it. Pat the cut dry. · Don't soak the cut, such as in a bathtub. Your doctor will tell you when it's safe to get the cut wet. · If your doctor told you how to care for your cut, follow your doctor's instructions. If you did not get instructions, follow this general advice:  ? After the first 24 to 48 hours, wash around the cut with clean water 2 times a day. Don't use hydrogen peroxide or alcohol, which can slow healing.   ? You may cover the cut with a thin layer of petroleum jelly, such as Vaseline, and a nonstick bandage. ? Apply more petroleum jelly and replace the bandage as needed. · Avoid any activity that could cause your cut to reopen. · Do not remove the stitches on your own. Your doctor will tell you when to come back to have the stitches removed. · Be safe with medicines. Take pain medicines exactly as directed. ? If the doctor gave you a prescription medicine for pain, take it as prescribed. ? If you are not taking a prescription pain medicine, ask your doctor if you can take an over-the-counter medicine. When should you call for help? Call your doctor now or seek immediate medical care if:    · You have new pain, or your pain gets worse.     · The skin near the cut is cold or pale or changes color.     · You have tingling, weakness, or numbness near the cut.     · The cut starts to bleed, and blood soaks through the bandage. Oozing small amounts of blood is normal.     · You have symptoms of infection, such as:  ? Increased pain, swelling, warmth, or redness around the cut.  ? Red streaks leading from the cut.  ? Pus draining from the cut.  ? A fever. Watch closely for changes in your health, and be sure to contact your doctor if:    · You do not get better as expected. Where can you learn more? Go to http://www.gray.com/  Enter M255 in the search box to learn more about \"Cuts on the Face Closed With Stitches: Care Instructions. \"  Current as of: June 26, 2019               Content Version: 12.6  © 3011-1314 [employer]. Care instructions adapted under license by [facility] (which disclaims liability or warranty for this information). If you have questions about a medical condition or this instruction, always ask your healthcare professional. [address] any warranty or liability for your use of this information.

## 2020-09-29 NOTE — ED NOTES
I have reviewed discharge instructions with the patient. The patient verbalized understanding. Patient left ED via Discharge Method: ambulatory to Home with self. Opportunity for questions and clarification provided. Patient given 1 scripts. To continue your aftercare when you leave the hospital, you may receive an automated call from our care team to check in on how you are doing. This is a free service and part of our promise to provide the best care and service to meet your aftercare needs.  If you have questions, or wish to unsubscribe from this service please call [phone]. Thank you for Choosing our [facility].

## 2020-09-29 NOTE — ED TRIAGE NOTES
Pt states she fell in the bathroom on and hit head on the bathtub. States she is not on blood thinners but unsure if she had LOC. States she had been drinking some ETOH. Swelling with bruising to right eye with a lac above the eye. Bleeding controlled at this time.

## 2020-09-29 NOTE — ED PROVIDER NOTES
52-year-old female presents to the ER following 2 falls, last night. Patient states she had been doing some drinking yesterday evening. She fell once somewhere in the house striking her head on the hardwood floors sustaining a laceration above and temporal to the lateral aspect of her right eyebrow. Patient does not know whether she got knocked out or not, she did get up and does remember cleaning the some of the blood up, she states she then went to the bathroom to try to wash the blood out of her hair and face, and then fell again, sustaining another fall, striking her head on the bathtub which caused a new crack on the porcelain from the impact of her head.     No nausea or vomiting           Past Medical History:   Diagnosis Date    Bronchitis, acute     COPD (chronic obstructive pulmonary disease) ([address] 10/1/2015    Crohn disease ([address]     Gastrointestinal disorder     crohns disease    HTN (hypertension) 2/6/2014    controlled with meds    Hypothyroidism     Other ill-defined conditions(799.89)     hypothyroid    Other ill-defined conditions(799.89) 02/16/2014    cellulitis of right arm/elbow from spider bite    Psychiatric disorder     depression, anxiety    Seizures ([address]     r/t low sodium    Thyroid disease        Past Surgical History:   Procedure Laterality Date    HX APPENDECTOMY  2008    HX BREAST REDUCTION Bilateral 1/20/2015    BREAST REDUCTION BILATERAL performed [facility]i [facility]cdonald [facility] MA[facility] OR    HX CHOLECYSTECTOMY      HX HYSTERECTOMY  2008    HX ORTHOPAEDIC      l clavicle    HX SHOULDER ARTHROSCOPY  2010         Family History:   Problem Relation Age of Onset    Heart Disease Mother     Cancer Father         bladder cancer 2010    Heart Disease Brother     Thyroid Disease Brother     Other Other         Mother  questionable emphysema, smoker    Breast Cancer Neg Hx        Social History     Socioeconomic History    Marital status:      Spouse name: Not on file    Number of children: Not on file    Years of education: Not on file    Highest education level: Not on file   Occupational History    Not on file   Social Needs    Financial resource strain: Not on file    Food insecurity     Worry: Not on file     Inability: Not on file    Transportation needs     Medical: Not on file     Non-medical: Not on file   Tobacco Use    Smoking status: Former Smoker     Packs/day: 1.00     Years: 30.00     Pack years: 30.00     Types: Cigarettes     Last attempt to quit: 2017     Years since quittin.8    Smokeless tobacco: Never Used   Substance and Sexual Activity    Alcohol use: Yes     Alcohol/week: 0.0 standard drinks     Comment: occassional    Drug use: No    Sexual activity: Not Currently   Lifestyle    Physical activity     Days per week: Not on file     Minutes per session: Not on file    Stress: Not on file   Relationships    Social connections     Talks on phone: Not on file     Gets together: Not on file     Attends Caodaism service: Not on file     Active member of club or organization: Not on file     Attends meetings of clubs or organizations: Not on file     Relationship status: Not on file    Intimate partner violence     Fear of current or ex partner: Not on file     Emotionally abused: Not on file     Physically abused: Not on file     Forced sexual activity: Not on file   Other Topics Concern    Not on file   Social History Narrative    Exposure history: + Tobacco use, she has had parakeets, parrot and cockatiel in the past.  Used hot tubs frequently for a period of 5 years. She has not lived in an endemic regions. No definite TB or asbestos exposure. Occasional EtOH use. 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 ALLERGIES: Levaquin [levofloxacin]    Review of Systems   HENT: Negative for dental problem and nosebleeds. Eyes: Negative for pain and redness.    Respiratory: Negative for shortness of breath and stridor. Cardiovascular: Negative for chest pain. Gastrointestinal: Negative for abdominal pain, nausea and vomiting. Genitourinary: Negative for difficulty urinating and hematuria. Musculoskeletal: Negative for arthralgias, back pain, gait problem, myalgias, neck pain and neck stiffness. Skin: Positive for wound. Negative for pallor. Neurological: Positive for headaches. Negative for dizziness, syncope, weakness and numbness. Vitals:    09/29/20 0710   BP: (!) 144/72   Pulse: 89   Resp: 16   Temp: 98.1 °F (36.7 °C)   SpO2: 97%   Weight: 77.1 kg (170 lb)   Height: 5' 8\" (1.727 m)            Physical Exam  Vitals signs and nursing note reviewed. Constitutional:       General: She is not in acute distress. Appearance: Normal appearance. She is well-developed. She is not ill-appearing, toxic-appearing or diaphoretic. HENT:      Head: Normocephalic. Eyes:      General: No scleral icterus. Right eye: No discharge. Left eye: No discharge. Extraocular Movements: Extraocular movements intact. Conjunctiva/sclera: Conjunctivae normal.      Pupils: Pupils are equal, round, and reactive to light. Neck:      Musculoskeletal: Normal range of motion and neck supple. Cardiovascular:      Rate and Rhythm: Normal rate and regular rhythm. Heart sounds: Normal heart sounds. No murmur. No gallop. Pulmonary:      Effort: Pulmonary effort is normal. No respiratory distress. Breath sounds: Normal breath sounds. No wheezing or rales. Abdominal:      General: Bowel sounds are normal.      Palpations: Abdomen is soft. Tenderness: There is no abdominal tenderness. There is no guarding. Musculoskeletal: Normal range of motion. Skin:     General: Skin is warm and dry. Neurological:      General: No focal deficit present. Mental Status: She is alert and oriented to person, place, and time. Mental status is at baseline.       Motor: No abnormal muscle tone. Comments: cni 2-12 grossly   Psychiatric:         Mood and Affect: Mood normal.         Behavior: Behavior normal.          MDM  Number of Diagnoses or Management Options  Diagnosis management comments: Medical decision making note:  Minor closed head trauma, suspect concussion, CT scan unrevealing, laceration repaired  This concludes the \"medical decision making note\" part of this emergency department visit note. Wound Repair    Date/Time: 9/29/2020 7:44 AM  Performed by: attendingPreparation: skin prepped with Betadine  Location details: face  Wound length:2.5 cm or less  Anesthesia: local infiltration    Anesthesia:  Local Anesthetic: lidocaine 1% without epinephrine  Anesthetic total: 4 mL  Foreign bodies: no foreign bodies  Irrigation solution: saline  Irrigation method: syringe  Debridement: none  Skin closure: 4-0 nylon and Prolene  Fascia closure: 5-0 nylon  Number of sutures: 3  Technique: simple  Approximation: close  Dressing: antibiotic ointment  Patient tolerance: Patient tolerated the procedure well with no immediate complications  My total time at bedside, performing this procedure was 1-15 minutes.

## 2020-10-27 ENCOUNTER — HOSPITAL ENCOUNTER (OUTPATIENT)
Dept: LAB | Age: 55
Discharge: HOME | End: 2020-10-27

## 2020-10-27 PROCEDURE — 88305 TISSUE EXAM BY PATHOLOGIST: CPT

## 2021-11-20 ENCOUNTER — APPOINTMENT (OUTPATIENT)
Dept: CT IMAGING | Age: 56
End: 2021-11-20
Attending: EMERGENCY MEDICINE
Payer: COMMERCIAL

## 2021-11-20 ENCOUNTER — HOSPITAL ENCOUNTER (OUTPATIENT)
Age: 56
Setting detail: OBSERVATION
Discharge: HOME | End: 2021-11-21
Attending: EMERGENCY MEDICINE | Admitting: INTERNAL MEDICINE
Payer: COMMERCIAL

## 2021-11-20 DIAGNOSIS — M79.609 PARESTHESIA AND PAIN OF LEFT EXTREMITY: ICD-10-CM

## 2021-11-20 DIAGNOSIS — R20.2 PARESTHESIA AND PAIN OF LEFT EXTREMITY: ICD-10-CM

## 2021-11-20 DIAGNOSIS — F41.1 GENERALIZED ANXIETY DISORDER: ICD-10-CM

## 2021-11-20 DIAGNOSIS — R47.01 EXPRESSIVE APHASIA: Primary | ICD-10-CM

## 2021-11-20 PROBLEM — R29.810 FACIAL DROOP: Status: ACTIVE | Noted: 2021-11-20

## 2021-11-20 PROBLEM — E87.6 HYPOKALEMIA: Status: ACTIVE | Noted: 2021-11-20

## 2021-11-20 LAB
ANION GAP SERPL CALC-SCNC: 8 MMOL/L (ref 7–16)
BASOPHILS # BLD: 0.1 K/UL (ref 0–0.2)
BASOPHILS NFR BLD: 1 % (ref 0–2)
BUN SERPL-MCNC: 13 MG/DL (ref 6–23)
CALCIUM SERPL-MCNC: 8.4 MG/DL (ref 8.3–10.4)
CHLORIDE SERPL-SCNC: 98 MMOL/L (ref 98–107)
CO2 SERPL-SCNC: 27 MMOL/L (ref 21–32)
CREAT SERPL-MCNC: 1.14 MG/DL (ref 0.6–1)
DIFFERENTIAL METHOD BLD: ABNORMAL
EOSINOPHIL # BLD: 0.1 K/UL (ref 0–0.8)
EOSINOPHIL NFR BLD: 1 % (ref 0.5–7.8)
ERYTHROCYTE [DISTWIDTH] IN BLOOD BY AUTOMATED COUNT: 17.1 % (ref 11.9–14.6)
GLUCOSE BLD STRIP.AUTO-MCNC: 109 MG/DL (ref 65–100)
GLUCOSE SERPL-MCNC: 106 MG/DL (ref 65–100)
HCT VFR BLD AUTO: 34 % (ref 35.8–46.3)
HGB BLD-MCNC: 10.5 G/DL (ref 11.7–15.4)
IMM GRANULOCYTES # BLD AUTO: 0.1 K/UL (ref 0–0.5)
IMM GRANULOCYTES NFR BLD AUTO: 1 % (ref 0–5)
INR PPP: 0.8
LYMPHOCYTES # BLD: 2.9 K/UL (ref 0.5–4.6)
LYMPHOCYTES NFR BLD: 24 % (ref 13–44)
MCH RBC QN AUTO: 26.4 PG (ref 26.1–32.9)
MCHC RBC AUTO-ENTMCNC: 30.9 G/DL (ref 31.4–35)
MCV RBC AUTO: 85.4 FL (ref 79.6–97.8)
MONOCYTES # BLD: 0.9 K/UL (ref 0.1–1.3)
MONOCYTES NFR BLD: 8 % (ref 4–12)
NEUTS SEG # BLD: 7.8 K/UL (ref 1.7–8.2)
NEUTS SEG NFR BLD: 66 % (ref 43–78)
NRBC # BLD: 0 K/UL (ref 0–0.2)
PLATELET # BLD AUTO: 463 K/UL (ref 150–450)
PMV BLD AUTO: 8.1 FL (ref 9.4–12.3)
POTASSIUM SERPL-SCNC: 3.1 MMOL/L (ref 3.5–5.1)
PROTHROMBIN TIME: 11.9 SEC (ref 12.6–14.5)
RBC # BLD AUTO: 3.98 M/UL (ref 4.05–5.2)
SERVICE CMNT-IMP: ABNORMAL
SODIUM SERPL-SCNC: 133 MMOL/L (ref 136–145)
TROPONIN-HIGH SENSITIVITY: 6.1 PG/ML (ref 0–14)
WBC # BLD AUTO: 11.9 K/UL (ref 4.3–11.1)

## 2021-11-20 PROCEDURE — 70498 CT ANGIOGRAPHY NECK: CPT

## 2021-11-20 PROCEDURE — 99285 EMERGENCY DEPT VISIT HI MDM: CPT

## 2021-11-20 PROCEDURE — 80048 BASIC METABOLIC PNL TOTAL CA: CPT

## 2021-11-20 PROCEDURE — 85610 PROTHROMBIN TIME: CPT

## 2021-11-20 PROCEDURE — 74011250637 HC RX REV CODE- 250/637: Performed by: INTERNAL MEDICINE

## 2021-11-20 PROCEDURE — 74011250637 HC RX REV CODE- 250/637: Performed by: FAMILY MEDICINE

## 2021-11-20 PROCEDURE — 85025 COMPLETE CBC W/AUTO DIFF WBC: CPT

## 2021-11-20 PROCEDURE — 74011000636 HC RX REV CODE- 636: Performed by: EMERGENCY MEDICINE

## 2021-11-20 PROCEDURE — 82962 GLUCOSE BLOOD TEST: CPT

## 2021-11-20 PROCEDURE — 84484 ASSAY OF TROPONIN QUANT: CPT

## 2021-11-20 PROCEDURE — G0378 HOSPITAL OBSERVATION PER HR: HCPCS

## 2021-11-20 PROCEDURE — 70450 CT HEAD/BRAIN W/O DYE: CPT

## 2021-11-20 PROCEDURE — 0042T CT PERF W CBF: CPT

## 2021-11-20 PROCEDURE — 74011000258 HC RX REV CODE- 258: Performed by: EMERGENCY MEDICINE

## 2021-11-20 RX ORDER — ACETAMINOPHEN 325 MG/1
650 TABLET ORAL
Status: DISCONTINUED | OUTPATIENT
Start: 2021-11-20 | End: 2021-11-21 | Stop reason: HOSPADM

## 2021-11-20 RX ORDER — PANTOPRAZOLE SODIUM 40 MG/1
40 TABLET, DELAYED RELEASE ORAL DAILY
Status: DISCONTINUED | OUTPATIENT
Start: 2021-11-21 | End: 2021-11-21 | Stop reason: HOSPADM

## 2021-11-20 RX ORDER — SODIUM CHLORIDE 0.9 % (FLUSH) 0.9 %
5-40 SYRINGE (ML) INJECTION EVERY 8 HOURS
Status: DISCONTINUED | OUTPATIENT
Start: 2021-11-20 | End: 2021-11-21 | Stop reason: HOSPADM

## 2021-11-20 RX ORDER — GUAIFENESIN 100 MG/5ML
81 LIQUID (ML) ORAL DAILY
Status: DISCONTINUED | OUTPATIENT
Start: 2021-11-21 | End: 2021-11-21 | Stop reason: HOSPADM

## 2021-11-20 RX ORDER — DICYCLOMINE HYDROCHLORIDE 20 MG/1
20 TABLET ORAL EVERY 6 HOURS
Status: DISCONTINUED | OUTPATIENT
Start: 2021-11-21 | End: 2021-11-21 | Stop reason: HOSPADM

## 2021-11-20 RX ORDER — ONDANSETRON 2 MG/ML
4 INJECTION INTRAMUSCULAR; INTRAVENOUS
Status: DISCONTINUED | OUTPATIENT
Start: 2021-11-20 | End: 2021-11-21 | Stop reason: HOSPADM

## 2021-11-20 RX ORDER — LEVOTHYROXINE SODIUM 112 UG/1
112 TABLET ORAL
Status: DISCONTINUED | OUTPATIENT
Start: 2021-11-21 | End: 2021-11-21 | Stop reason: HOSPADM

## 2021-11-20 RX ORDER — SODIUM CHLORIDE 0.9 % (FLUSH) 0.9 %
10 SYRINGE (ML) INJECTION
Status: COMPLETED | OUTPATIENT
Start: 2021-11-20 | End: 2021-11-20

## 2021-11-20 RX ORDER — LORAZEPAM 1 MG/1
1 TABLET ORAL 2 TIMES DAILY
Status: DISCONTINUED | OUTPATIENT
Start: 2021-11-20 | End: 2021-11-21 | Stop reason: HOSPADM

## 2021-11-20 RX ORDER — ESCITALOPRAM OXALATE 10 MG/1
10 TABLET ORAL DAILY
Status: DISCONTINUED | OUTPATIENT
Start: 2021-11-21 | End: 2021-11-21 | Stop reason: HOSPADM

## 2021-11-20 RX ORDER — LAMOTRIGINE 100 MG/1
200 TABLET ORAL 2 TIMES DAILY
Status: DISCONTINUED | OUTPATIENT
Start: 2021-11-20 | End: 2021-11-21 | Stop reason: HOSPADM

## 2021-11-20 RX ORDER — SODIUM CHLORIDE 0.9 % (FLUSH) 0.9 %
5-40 SYRINGE (ML) INJECTION AS NEEDED
Status: DISCONTINUED | OUTPATIENT
Start: 2021-11-20 | End: 2021-11-21 | Stop reason: HOSPADM

## 2021-11-20 RX ORDER — POTASSIUM CHLORIDE 20 MEQ/1
40 TABLET, EXTENDED RELEASE ORAL
Status: COMPLETED | OUTPATIENT
Start: 2021-11-20 | End: 2021-11-20

## 2021-11-20 RX ORDER — ATORVASTATIN CALCIUM 40 MG/1
40 TABLET, FILM COATED ORAL
Status: DISCONTINUED | OUTPATIENT
Start: 2021-11-20 | End: 2021-11-21 | Stop reason: HOSPADM

## 2021-11-20 RX ORDER — BUPROPION HYDROCHLORIDE 150 MG/1
150 TABLET, EXTENDED RELEASE ORAL DAILY
Status: DISCONTINUED | OUTPATIENT
Start: 2021-11-21 | End: 2021-11-21 | Stop reason: HOSPADM

## 2021-11-20 RX ADMIN — ACETAMINOPHEN 650 MG: 325 TABLET ORAL at 20:03

## 2021-11-20 RX ADMIN — SODIUM CHLORIDE 100 ML: 900 INJECTION, SOLUTION INTRAVENOUS at 16:39

## 2021-11-20 RX ADMIN — IOPAMIDOL 100 ML: 755 INJECTION, SOLUTION INTRAVENOUS at 16:38

## 2021-11-20 RX ADMIN — LORAZEPAM 1 MG: 1 TABLET ORAL at 20:03

## 2021-11-20 RX ADMIN — Medication 10 ML: at 16:39

## 2021-11-20 RX ADMIN — DICYCLOMINE HYDROCHLORIDE 20 MG: 20 TABLET ORAL at 23:54

## 2021-11-20 RX ADMIN — LAMOTRIGINE 200 MG: 100 TABLET ORAL at 20:03

## 2021-11-20 RX ADMIN — ATORVASTATIN CALCIUM 40 MG: 40 TABLET, FILM COATED ORAL at 20:03

## 2021-11-20 RX ADMIN — POTASSIUM CHLORIDE 40 MEQ: 20 TABLET, EXTENDED RELEASE ORAL at 20:03

## 2021-11-20 NOTE — Clinical Note
Status[de-identified] INPATIENT [101]   Type of Bed: Remote Telemetry [29]   Cardiac Monitoring Required?: Yes   Inpatient Hospitalization Certified Necessary for the Following Reasons: 3.  Patient receiving treatment that can only be provided in an inpatient setting (further clarification in H&P documentation)   Admitting Diagnosis: Facial droop [[id number]]   Admitting Physician: Zenovia Halsted [[id number]]   Attending Physician: Zenovia Halsted [[id number]]   Estimated Length of Stay: 2 Midnights   Discharge Plan[de-identified] Other ([address])

## 2021-11-20 NOTE — ED NOTES
Patient was at work and between [id number] she noticed she was having trouble finding her words, followed by her tongue going numb and then felt like she could not smile correctly. Patient with some facial droop on left side side confirmed by another nurse at bedside. Patient also advises feeling like she was going to pass out. Patient advises that she is having a crohn's flare however has never had this problem before. Mask on during triage.

## 2021-11-20 NOTE — H&P
Hospitalist History and Physical   Admit Date:  2021  2:59 PM   Name:  Sal Riley   Age:  55 y.o. Sex:  female  :  1965   MRN:  [id number]   Room:  03/    Presenting Complaint: Facial Droop    Reason(s) for Admission: Facial droop [R29.810]     History of Present Illness:   Sal Riley is a 55 y.o. female with a history of anxiety, Crohn's disease, hypothyroidism, HTN and RLS who presents today with sudden onset facial droop. She was at work when she suddenly felt like she was having an \"out of body\" type experience. She wasn't confused but felt like she was hallucinating. She then noticed some numbness of the tongue. There was mention of some facial droop which she says is chronic for her but thinks perhaps it is ia little more pronounced than usual. She also had associated word finding difficulty. Of note, she has been working with her Psychiatrist outpatient in regards to tapering off Ativan. Was previously taking 2mg TID and has been weaned down to 1mg BID, however the past few days she was taking 0.5mg BID because the Rx was written incorrectly. She presented to the ER where a Code S was called. CT, CTA, CTp are all unremarkable. K low, labs otherwise ok. She was seen by Tele-neurology, NIH 0 and not given tPA. No headaches, chest pain, palpitations, SOB/CUEVAS, N/V/D, abdo discomfort, fevers, peripheral edema. She is back to baseline. Hospitalist consulted for admission and further management. Review of Systems:  10 systems reviewed and negative except as noted in HPI. Assessment & Plan:   # Facial droop/stroke like symptoms   - Symptoms resolved, NIH 0, seen by Tele-Neuro, no tPA, CTs all unremarkable. Her symptoms may also represent some benzo withdrawal. Will put her back on 1mg BID which should be her current outpatient tapering dose. Admit for remainder of CVA work up. # Anxiety   - Tapering Ativan outpatient, possible w/d as noted above. Con't with 1mg BID.  Con't home meds otherwise. # Hypothyroidism    - Synthroid    # HTN   - Hold BP meds with permissive HTN     # GERD   - PPI    Dispo/Discharge Planning: Home, likely tomorrow.    Diet: DIET NPO  VTE ppx: SCDs, ambulation  Code status: Prior    Hospital Problems as of 11/20/2021 Date Reviewed: 11/20/2021          Codes Class Noted - Resolved POA    * (Principal) Facial droop ICD-10-CM: R29.810  ICD-9-CM: 781.94  11/20/2021 - Present Unknown        RLS (restless legs syndrome) ICD-10-CM: G25.81  ICD-9-CM: 333.94  7/2/2018 - Present Yes        HTN (hypertension) ICD-10-CM: I10  ICD-9-CM: 401.9  2/6/2014 - Present Yes        Crohn's colitis ([address] ICD-10-CM: K50.10  ICD-9-CM: 555.1  10/11/2013 - Present Yes        Hypothyroidism ICD-10-CM: E03.9  ICD-9-CM: 244.9  Unknown - Present Yes              Past History:  Past Medical History:   Diagnosis Date    Bronchitis, acute     COPD (chronic obstructive pulmonary disease) ([address] 10/1/2015    Crohn disease ([address]     Gastrointestinal disorder     crohns disease    HTN (hypertension) 2/6/2014    controlled with meds    Hypothyroidism     Other ill-defined conditions(799.89)     hypothyroid    Other ill-defined conditions(799.89) 02/16/2014    cellulitis of right arm/elbow from spider bite    Psychiatric disorder     depression, anxiety    Seizures ([address]     r/t low sodium    Thyroid disease      Past Surgical History:   Procedure Laterality Date    HX APPENDECTOMY  2008    HX BREAST REDUCTION Bilateral 1/20/2015    BREAST REDUCTION BILATERAL performed by Bowen Mont[facility] at [facility] MA[facility] OR    HX CHOLECYSTECTOMY      HX HYSTERECTOMY  2008    HX ORTHOPAEDIC      l clavicle    HX SHOULDER ARTHROSCOPY  2010      Allergies   Allergen Reactions    Levaquin [Levofloxacin] Palpitations      Social History     Tobacco Use    Smoking status: Former Smoker     Packs/day: 1.00     Years: 30.00     Pack years: 30.00     Types: Cigarettes     Quit date: 12/2/2017     Years since quitting: 3.9    Smokeless tobacco: Never Used   Substance Use Topics    Alcohol use: Yes     Alcohol/week: 0.0 standard drinks     Comment: occassional      Family History   Problem Relation Age of Onset    Heart Disease Mother     Cancer Father         bladder cancer 2010    Heart Disease Brother     Thyroid Disease Brother     Other Other         Mother  questionable emphysema, smoker    Breast Cancer Neg Hx       Family history reviewed and negative except as otherwise noted.     Immunization History   Administered Date(s) Administered    Influenza Vaccine 10/07/2013    Influenza Vaccine (>6 mo Afluria QUAD Vial 32835 (0.25 mL) / 84621 (0.5 mL)) 08/07/2019    Influenza Vaccine (Quad) PF (>6 Mo Flulaval, Fluarix, and >3 Yrs Afluria, Fluzone 08082) 11/17/2015, 09/26/2018    Influenza Vaccine Whole 10/18/2011    Pneumococcal Polysaccharide (PPSV-23) 11/17/2015    Tdap 06/07/2019     Prior to Admit Medications:  Current Outpatient Medications   Medication Instructions    amLODIPine (NORVASC) 5 mg tablet TAKE ONE TABLET BY MOUTH ONE TIME DAILY    B complx-C-folic-zinc-copper-E (STRESS B-COMPLEX) 500 mg-400 mcg- 24 mg-3 mg tab Oral    buPROPion XL (WELLBUTRIN XL) 150 mg, Oral, DAILY    butalbital-acetaminophen-caff (Fioricet) -40 mg per capsule 1-2 Capsules, Oral, EVERY 6 HOURS AS NEEDED    dicyclomine (BENTYL) 20 mg, Oral, EVERY 6 HOURS    Liberty 0.05 mg/24 hr APPLY ONE PATCH TO THE SKIN TWICE A WEEK    Liberty 0.1 mg/24 hr APPLY ONE PATCH TO THE SKIN TWICE A WEEK    ergocalciferol (ERGOCALCIFEROL) 50,000 Units, Oral, EVERY 7 DAYS    escitalopram oxalate (LEXAPRO) 10 mg, Oral, DAILY    lamoTRIgine (LAMICTAL) 200 mg, Oral, 2 TIMES DAILY    Latuda 60 mg, Oral, EVERY BEDTIME    levothyroxine (SYNTHROID) 112 mcg tablet TAKE ONE TABLET BY MOUTH ONE TIME DAILY BEFORE BREAKFAST    LORazepam (ATIVAN) 2 mg tablet Oral, EVERY 6 HOURS AS NEEDED    miconazole (MONISTAT 7) 2 % vaginal cream 1 Applicator, Vaginal, EVERY BEDTIME    pantoprazole (PROTONIX) 40 mg tablet TAKE ONE TABLET BY MOUTH ONE TIME DAILY    suvorexant (BELSOMRA) 15 mg tablet Oral, BEDTIME PRN    tolterodine ER (DETROL LA) 4 mg ER capsule TAKE ONE CAPSULE BY MOUTH ONE TIME DAILY    vedolizumab (ENTYVIO) 300 mg, IntraVENous, ONCE       Objective:     Patient Vitals for the past 24 hrs:   Temp Pulse Resp BP SpO2   11/20/21 1642  73  (!) 159/76    11/20/21 1537     98 %   11/20/21 1528  73 18 (!) 179/83 100 %   11/20/21 1500 99.1 °F (37.3 °C) 83 16 (!) 198/92 99 %     Oxygen Therapy  O2 Sat (%): 98 % (11/20/21 1537)  Pulse via Oximetry: 99 beats per minute (11/20/21 1537)  O2 Device: None (Room air) (11/20/21 1537)    Estimated body mass index is 26.61 kg/m² as calculated from the following:    Height as of this encounter: 5' 8\" (1.727 m). Weight as of this encounter: 79.4 kg (175 lb). No intake or output data in the 24 hours ending 11/20/21 1747      Physical Exam:  Blood pressure (!) 159/76, pulse 73, temperature 99.1 °F (37.3 °C), resp. rate 18, height 5' 8\" (1.727 m), weight 79.4 kg (175 lb), SpO2 98 %. General:    Well nourished. No overt distress. Head:  Normocephalic, atraumatic  Eyes:  Sclerae appear normal.  Pupils equally round. ENT:  Nares appear normal, no drainage. Moist oral mucosa  Neck:  No restricted ROM. Trachea midline   CV:   RRR. No m/r/g. No jugular venous distension. Lungs:   CTAB. No wheezing, rhonchi, or rales. Respirations even, unlabored  Abdomen: Bowel sounds present. Soft, nontender, nondistended. Extremities: No cyanosis or clubbing. No edema. Skin:     No rashes and normal coloration. Warm and dry. Neuro:  CN II-XII grossly intact. Sensation intact. A&Ox3. Very slight L sided facial droop, eyebrow raise is symmetric. Tongue midline on protrusion. Mild bidirectional nystagmus. No pronator drift.  strength is normal and equal bilaterally.  LE strength is normal and equal.   Psych:  Normal mood and affect. I have reviewed ordered lab tests and independently visualized imaging below:    Last 24hr Labs:  Recent Results (from the past 24 hour(s))   GLUCOSE, POC    Collection Time: 11/20/21  3:02 PM   Result Value Ref Range    Glucose (POC) 109 (H) 65 - 100 mg/dL    Performed by Dian    CBC WITH AUTOMATED DIFF    Collection Time: 11/20/21  3:12 PM   Result Value Ref Range    WBC 11.9 (H) 4.3 - 11.1 K/uL    RBC 3.98 (L) 4.05 - 5.2 M/uL    HGB 10.5 (L) 11.7 - 15.4 g/dL    HCT 34.0 (L) 35.8 - 46.3 %    MCV 85.4 79.6 - 97.8 FL    MCH 26.4 26.1 - 32.9 PG    MCHC 30.9 (L) 31.4 - 35.0 g/dL    RDW 17.1 (H) 11.9 - 14.6 %    PLATELET 355 (H) 367 - 450 K/uL    MPV 8.1 (L) 9.4 - 12.3 FL    ABSOLUTE NRBC 0.00 0.0 - 0.2 K/uL    DF AUTOMATED      NEUTROPHILS 66 43 - 78 %    LYMPHOCYTES 24 13 - 44 %    MONOCYTES 8 4.0 - 12.0 %    EOSINOPHILS 1 0.5 - 7.8 %    BASOPHILS 1 0.0 - 2.0 %    IMMATURE GRANULOCYTES 1 0.0 - 5.0 %    ABS. NEUTROPHILS 7.8 1.7 - 8.2 K/UL    ABS. LYMPHOCYTES 2.9 0.5 - 4.6 K/UL    ABS. MONOCYTES 0.9 0.1 - 1.3 K/UL    ABS. EOSINOPHILS 0.1 0.0 - 0.8 K/UL    ABS. BASOPHILS 0.1 0.0 - 0.2 K/UL    ABS. IMM.  GRANS. 0.1 0.0 - 0.5 K/UL   METABOLIC PANEL, BASIC    Collection Time: 11/20/21  3:12 PM   Result Value Ref Range    Sodium 133 (L) 136 - 145 mmol/L    Potassium 3.1 (L) 3.5 - 5.1 mmol/L    Chloride 98 98 - 107 mmol/L    CO2 27 21 - 32 mmol/L    Anion gap 8 7 - 16 mmol/L    Glucose 106 (H) 65 - 100 mg/dL    BUN 13 6 - 23 MG/DL    Creatinine 1.14 (H) 0.6 - 1.0 MG/DL    GFR est AA >60 >60 ml/min/1.73m2    GFR est non-AA 53 (L) >60 ml/min/1.73m2    Calcium 8.4 8.3 - 10.4 MG/DL   PROTHROMBIN TIME + INR    Collection Time: 11/20/21  3:12 PM   Result Value Ref Range    Prothrombin time 11.9 (L) 12.6 - 14.5 sec    INR 0.8     TROPONIN-HIGH SENSITIVITY    Collection Time: 11/20/21  3:12 PM   Result Value Ref Range    Troponin-High Sensitivity 6.1 0 - 14 pg/mL       All Micro Results     None          Other Studies:  CT PERF W CBF    Result Date: 11/20/2021  CT Perfusion Imaging INDICATION:  Dizziness onset 3097-Ghfkrvlgr-In states unable to smile -Code Stroke CT perfusion imaging of the brain was performed after the administration of intravenous contrast.  Perfusion maps and perfusion analysis output were generated using the [employer] perfusion processing software algorithm. Radiation dose reduction techniques were used for this study: All CT scans performed at this facility use one or all of the following: Automated exposure control, adjustment of the mA and/or kVp according to patient's size, iterative reconstruction. COMPARISON: None. Correlation is made to the CT scan of the brain and CTA on the same day. [employer] Global Output Values: CBF < 30% volume:  0 ml   (core infarction volume greater than 50 cc associated with poor outcomes) Tmax > 6 seconds: 0 ml Tmax/CBF Mismatch Volume: 0 ml Tmax/CBF Mismatch Ratio: N/A Hypoperfusion Intensity Ratio (Tmax > 10 seconds/Tmax > 6 seconds): 0   (values greater than 0.5 associated with poor outcome) Tmax > 10 seconds Volume: 0 ml   (volume greater than 100 mL is associated with poor outcome) There is Tmax>4 seconds of 57 mL within the left occipital and parietal regions. The findings may represent changes of chronic oligemia. No CT evidence of acute perfusion abnormality. Please note that the determination of patient treatment is not based solely upon imaging factors or calculation values. Management of ischemia is at the discretion of the primary physician and is based upon a combination of clinical and imaging data, along other factors. CT CODE NEURO HEAD WO CONTRAST    Result Date: 11/20/2021  NONCONTRAST HEAD CT CLINICAL HISTORY:  Code stroke with dizziness and decreased level of consciousness since 1230 hours today. TECHNIQUE:  Axial images were obtained with spiral technique.   Radiation dose reduction was achieved using one or all of the following techniques: automated exposure control, weight-based dosing, iterative reconstruction. COMPARISON:  September 29, 2020. REPORT:   Standard noncontrast head CT demonstrates no definite intracranial mass effect, hemorrhage, or evidence of acute geographic infarction. The ventricles are normal in size and configuration, accounting for the patient's age. Orbits  and paranasal sinuses are clear where imaged. Bone windows demonstrate no definite fracture or destruction. NO ACUTE INTRACRANIAL ABNORMALITY IDENTIFIED AT NONCONTRAST CT. Medications Administered     iopamidoL (ISOVUE-370) 76 % injection 100 mL     Admin Date  11/20/2021 Action  Given Dose  100 mL Route  IntraVENous Administered By  Kathy HIGGINS          saline peripheral flush soln 10 mL     Admin Date  11/20/2021 Action  Given Dose  10 mL Route  InterCATHeter Administered By  Lg Marmolejo          sodium chloride 0.9 % bolus infusion 100 mL     Admin Date  11/20/2021 Action  New Bag Dose  100 mL Route  IntraVENous Administered By  Lg Marmolejo                Signed:  [place] [employer], MD    Part of this note may have been written by using a voice dictation software. The note has been proof read but may still contain some grammatical/other typographical errors.

## 2021-11-20 NOTE — PROGRESS NOTES
Outreach RN responded to [address] in ED. Patient transported t o CT on monitor with primary RN.

## 2021-11-20 NOTE — ED NOTES
Ischemic Stroke without Activase/TIA    VTE Prophylaxis: No    Antiplatelet: No    Statin if LDL Greater Than or Equal to100: No    BP Parameters: Less Than 220/120 for 24 hours, then consult MD for parameters    Controlled With: Scheduled PO    Dysphagia Screen Completed: Yes: Pass  Dysphagia Screening  Vocal Quality/Secretions: Normal  History of Dysphagia: No  O2 Saturation: Normal  Alertness: Normal  Pre-Swallow Assessment Score: 0  Purees: No difficulty noted  Water by Cup: No difficulty noted  Water by Straw: No difficulty noted    Patient has PEG, NG Tube, Feeding Tube: No    Medication orders per above route: Yes    Nutrition Status: PO    NIH Stroke Scale Complete: Yes: 1    Frequency of Vital Signs: Every 2 hours     Frequency of Neuro Checks: Every 2 hours    Daily Education/Care Plan Updated: No     Report given to Fanta Barrow RN

## 2021-11-20 NOTE — ED PROVIDER NOTES
41-year-old female has a history of some COPD and Crohn's disease. Also some hypertension and hypothyroidism as well. Surgeries include appendectomy hysterectomy and cholecystectomy. Patient states she was at work today when she noticed sometime after arriving at work at noon that she was having some difficulty with speech and could not find the right words to use. She also had some light lid and this had some numbness and tingling in her tongue felt like maybe her face was drooping slightly on the left and she has some tingling in the left arm as well. No vertigo or visual changes. No difficulty walking. No history of TIA. The history is provided by the patient. Facial Droop  This is a new problem. The current episode started 3 to 5 hours ago. The problem has not changed since onset. There was left facial focality noted. Primary symptoms include focal weakness, speech difficulty and mental status change. Pertinent negatives include no loss of sensation, no loss of balance, no slurred speech and no visual change. There has been no fever. Associated symptoms include altered mental status. Pertinent negatives include no shortness of breath, no chest pain, no vomiting and no headaches. Associated medical issues do not include CVA.         Past Medical History:   Diagnosis Date    Bronchitis, acute     COPD (chronic obstructive pulmonary disease) ([address] 10/1/2015    Crohn disease ([address]     Gastrointestinal disorder     crohns disease    HTN (hypertension) 2/6/2014    controlled with meds    Hypothyroidism     Other ill-defined conditions(799.89)     hypothyroid    Other ill-defined conditions(799.89) 02/16/2014    cellulitis of right arm/elbow from spider bite    Psychiatric disorder     depression, anxiety    Seizures ([address]     r/t low sodium    Thyroid disease        Past Surgical History:   Procedure Laterality Date    HX APPENDECTOMY  2008    HX BREAST REDUCTION Bilateral 1/20/2015    BREAST REDUCTION BILATERAL performed [facility] MAIN OR    HX CHOLECYSTECTOMY      HX HYSTERECTOMY  2008    HX ORTHOPAEDIC      l clavicle    HX SHOULDER ARTHROSCOPY  2010         Family History:   Problem Relation Age of Onset    Heart Disease Mother     Cancer Father         bladder cancer 2010    Heart Disease Brother     Thyroid Disease Brother     Other Other         Mother  questionable emphysema, smoker    Breast Cancer Neg Hx        Social History     Socioeconomic History    Marital status:      Spouse name: Not on file    Number of children: Not on file    Years of education: Not on file    Highest education level: Not on file   Occupational History    Not on file   Tobacco Use    Smoking status: Former Smoker     Packs/day: 1.00     Years: 30.00     Pack years: 30.00     Types: Cigarettes     Quit date: 12/2/2017     Years since quitting: 3.9    Smokeless tobacco: Never Used   Substance and Sexual Activity    Alcohol use: Yes     Alcohol/week: 0.0 standard drinks     Comment: occassional    Drug use: No    Sexual activity: Not Currently   Other Topics Concern    Not on file   Social History Narrative    Exposure history: + Tobacco use, she has had parakeets, parrot and cockatiel in the past.  Used hot tubs frequently for a period of 5 years. She has not lived in an endemic regions. No definite TB or asbestos exposure. Occasional EtOH use. 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 Social Determinants of Health     Financial Resource Strain:     Difficulty of Paying Living Expenses: Not on file   Food Insecurity:     Worried About Running Out of Food in the Last Year: Not on file    Chaz of Food in the Last Year: Not on file   Transportation Needs:     Lack of Transportation (Medical): Not on file    Lack of Transportation (Non-Medical):  Not on file   Physical Activity:     Days of Exercise per Week: Not on file    Minutes of Exercise per Session: Not on file   Stress:     Feeling of Stress : Not on file   Social Connections:     Frequency of Communication with Friends and Family: Not on file    Frequency of Social Gatherings with Friends and Family: Not on file    Attends Christianity Services: Not on file    Active Member of Clubs or Organizations: Not on file    Attends Club or Organization Meetings: Not on file    Marital Status: Not on file   Intimate Partner Violence:     Fear of Current or Ex-Partner: Not on file    Emotionally Abused: Not on file    Physically Abused: Not on file    Sexually Abused: Not on file   Housing Stability:     Unable to Pay for Housing in the Last Year: Not on file    Number of [place] in the Last Year: Not on file    Unstable Housing in the Last Year: Not on file         ALLERGIES: Levaquin [levofloxacin]    Review of Systems   Constitutional: Negative for chills and fever. Respiratory: Negative for cough and shortness of breath. Cardiovascular: Negative for chest pain and palpitations. Gastrointestinal: Negative for abdominal pain, diarrhea and vomiting. Genitourinary: Negative for dysuria and flank pain. Musculoskeletal: Negative for back pain and neck pain. Skin: Negative for color change and rash. Neurological: Positive for dizziness, focal weakness, facial asymmetry, speech difficulty, weakness, light-headedness and numbness. Negative for seizures, syncope, headaches and loss of balance. All other systems reviewed and are negative. Vitals:    11/20/21 1500   BP: (!) 198/92   Pulse: 83   Resp: 16   Temp: 99.1 °F (37.3 °C)   SpO2: 99%   Weight: 79.4 kg (175 lb)   Height: 5' 8\" (1.727 m)            Physical Exam  Vitals and nursing note reviewed. Constitutional:       General: She is not in acute distress. Appearance: She is well-developed. HENT:      Head: Normocephalic and atraumatic.       Right Ear: External ear normal.      Left Ear: External ear normal.      Mouth/Throat:      Pharynx: No oropharyngeal exudate. Eyes:      Conjunctiva/sclera: Conjunctivae normal.      Pupils: Pupils are equal, round, and reactive to light. Cardiovascular:      Rate and Rhythm: Normal rate and regular rhythm. Heart sounds: No murmur heard. Pulmonary:      Effort: No respiratory distress. Breath sounds: Normal breath sounds. Musculoskeletal:      Cervical back: Normal range of motion and neck supple. Skin:     General: Skin is warm and dry. Neurological:      Mental Status: She is alert and oriented to person, place, and time. Gait: Gait normal.      Comments: Nl speech  No drift. Normal finger-nose testing. Normal heel-to-shin. Wound no obvious expressive aphasia at this point. Perhaps some slight subjective decreased sensation face and left arm on the left. Psychiatric:         Speech: Speech normal.          MDM  Number of Diagnoses or Management Options  Diagnosis management comments: Patient states she has been on Ativan for years. She has been tapering as of lately. No history of seizures. Feels main issues with word finding. But does have some tingling. Symptoms started about 3 hours ago. Code stroke called.        Amount and/or Complexity of Data Reviewed  Clinical lab tests: ordered and reviewed  Tests in the radiology section of CPT®: ordered and reviewed  Tests in the medicine section of CPT®: ordered and reviewed  Review and summarize past medical records: yes  Discuss the patient with other providers: yes  Independent visualization of images, tracings, or specimens: yes    Risk of Complications, Morbidity, and/or Mortality  Presenting problems: moderate  Diagnostic procedures: low  Management options: low    Patient Progress  Patient progress: stable         Procedures      Results Include:    Recent Results (from the past 24 hour(s))   GLUCOSE, POC    Collection Time: 11/20/21  3:02 PM   Result Value Ref Range    Glucose (POC) 109 (H) 65 - 100 mg/dL    Performed by Dian    CBC WITH AUTOMATED DIFF    Collection Time: 11/20/21  3:12 PM   Result Value Ref Range    WBC 11.9 (H) 4.3 - 11.1 K/uL    RBC 3.98 (L) 4.05 - 5.2 M/uL    HGB 10.5 (L) 11.7 - 15.4 g/dL    HCT 34.0 (L) 35.8 - 46.3 %    MCV 85.4 79.6 - 97.8 FL    MCH 26.4 26.1 - 32.9 PG    MCHC 30.9 (L) 31.4 - 35.0 g/dL    RDW 17.1 (H) 11.9 - 14.6 %    PLATELET 284 (H) 744 - 450 K/uL    MPV 8.1 (L) 9.4 - 12.3 FL    ABSOLUTE NRBC 0.00 0.0 - 0.2 K/uL    DF AUTOMATED      NEUTROPHILS 66 43 - 78 %    LYMPHOCYTES 24 13 - 44 %    MONOCYTES 8 4.0 - 12.0 %    EOSINOPHILS 1 0.5 - 7.8 %    BASOPHILS 1 0.0 - 2.0 %    IMMATURE GRANULOCYTES 1 0.0 - 5.0 %    ABS. NEUTROPHILS 7.8 1.7 - 8.2 K/UL    ABS. LYMPHOCYTES 2.9 0.5 - 4.6 K/UL    ABS. MONOCYTES 0.9 0.1 - 1.3 K/UL    ABS. EOSINOPHILS 0.1 0.0 - 0.8 K/UL    ABS. BASOPHILS 0.1 0.0 - 0.2 K/UL    ABS. IMM. GRANS. 0.1 0.0 - 0.5 K/UL   METABOLIC PANEL, BASIC    Collection Time: 11/20/21  3:12 PM   Result Value Ref Range    Sodium 133 (L) 136 - 145 mmol/L    Potassium 3.1 (L) 3.5 - 5.1 mmol/L    Chloride 98 98 - 107 mmol/L    CO2 27 21 - 32 mmol/L    Anion gap 8 7 - 16 mmol/L    Glucose 106 (H) 65 - 100 mg/dL    BUN 13 6 - 23 MG/DL    Creatinine 1.14 (H) 0.6 - 1.0 MG/DL    GFR est AA >60 >60 ml/min/1.73m2    GFR est non-AA 53 (L) >60 ml/min/1.73m2    Calcium 8.4 8.3 - 10.4 MG/DL   PROTHROMBIN TIME + INR    Collection Time: 11/20/21  3:12 PM   Result Value Ref Range    Prothrombin time 11.9 (L) 12.6 - 14.5 sec    INR 0.8     TROPONIN-HIGH SENSITIVITY    Collection Time: 11/20/21  3:12 PM   Result Value Ref Range    Troponin-High Sensitivity 6.1 0 - 14 pg/mL       CT CODE NEURO HEAD WO CONTRAST    Result Date: 11/20/2021  NONCONTRAST HEAD CT CLINICAL HISTORY:  Code stroke with dizziness and decreased level of consciousness since 1230 hours today. TECHNIQUE:  Axial images were obtained with spiral technique.   Radiation dose reduction was achieved using one or all of the following techniques: automated exposure control, weight-based dosing, iterative reconstruction. COMPARISON:  September 29, 2020. REPORT:   Standard noncontrast head CT demonstrates no definite intracranial mass effect, hemorrhage, or evidence of acute geographic infarction. The ventricles are normal in size and configuration, accounting for the patient's age. Orbits  and paranasal sinuses are clear where imaged. Bone windows demonstrate no definite fracture or destruction. NO ACUTE INTRACRANIAL ABNORMALITY IDENTIFIED AT NONCONTRAST CT. Neurology has seen. They do not believe to be TPA candidate since symptoms have improved. Possibility of TIA versus benzodiazepine withdrawal.  We'll proceed with CT angios CT perfusion.

## 2021-11-21 ENCOUNTER — APPOINTMENT (OUTPATIENT)
Dept: MRI IMAGING | Age: 56
End: 2021-11-21
Attending: INTERNAL MEDICINE
Payer: COMMERCIAL

## 2021-11-21 PROBLEM — F41.1 GENERALIZED ANXIETY DISORDER: Status: ACTIVE | Noted: 2021-11-21

## 2021-11-21 PROBLEM — R29.810 FACIAL DROOP: Status: RESOLVED | Noted: 2021-11-20 | Resolved: 2021-11-21

## 2021-11-21 PROBLEM — E87.6 HYPOKALEMIA: Status: RESOLVED | Noted: 2021-11-20 | Resolved: 2021-11-21

## 2021-11-21 LAB
CHOLEST SERPL-MCNC: 231 MG/DL
EST. AVERAGE GLUCOSE BLD GHB EST-MCNC: 114 MG/DL
HBA1C MFR BLD: 5.6 % (ref 4.2–6.3)
HDLC SERPL-MCNC: 114 MG/DL (ref 40–60)
HDLC SERPL: 2 {RATIO}
LDLC SERPL CALC-MCNC: 68.4 MG/DL
TRIGL SERPL-MCNC: 243 MG/DL (ref 35–150)
VLDLC SERPL CALC-MCNC: 48.6 MG/DL (ref 6–23)

## 2021-11-21 PROCEDURE — 36415 COLL VENOUS BLD VENIPUNCTURE: CPT

## 2021-11-21 PROCEDURE — 83036 HEMOGLOBIN GLYCOSYLATED A1C: CPT

## 2021-11-21 PROCEDURE — 96374 THER/PROPH/DIAG INJ IV PUSH: CPT

## 2021-11-21 PROCEDURE — G0378 HOSPITAL OBSERVATION PER HR: HCPCS

## 2021-11-21 PROCEDURE — 74011250637 HC RX REV CODE- 250/637: Performed by: INTERNAL MEDICINE

## 2021-11-21 PROCEDURE — 80061 LIPID PANEL: CPT

## 2021-11-21 PROCEDURE — 70551 MRI BRAIN STEM W/O DYE: CPT

## 2021-11-21 PROCEDURE — 74011250637 HC RX REV CODE- 250/637: Performed by: FAMILY MEDICINE

## 2021-11-21 PROCEDURE — 74011250636 HC RX REV CODE- 250/636: Performed by: INTERNAL MEDICINE

## 2021-11-21 RX ORDER — LANOLIN ALCOHOL/MO/W.PET/CERES
3 CREAM (GRAM) TOPICAL
Status: DISCONTINUED | OUTPATIENT
Start: 2021-11-21 | End: 2021-11-21 | Stop reason: HOSPADM

## 2021-11-21 RX ORDER — GUAIFENESIN 100 MG/5ML
81 LIQUID (ML) ORAL DAILY
Qty: 30 TABLET | Refills: 0 | Status: SHIPPED | OUTPATIENT
Start: 2021-11-21

## 2021-11-21 RX ORDER — LORAZEPAM 1 MG/1
1 TABLET ORAL 2 TIMES DAILY
Qty: 14 TABLET | Refills: 0 | Status: SHIPPED | OUTPATIENT
Start: 2021-11-21 | End: 2021-11-28

## 2021-11-21 RX ORDER — ATORVASTATIN CALCIUM 40 MG/1
40 TABLET, FILM COATED ORAL
Qty: 30 TABLET | Refills: 0 | Status: SHIPPED | OUTPATIENT
Start: 2021-11-21

## 2021-11-21 RX ADMIN — LORAZEPAM 1 MG: 1 TABLET ORAL at 07:33

## 2021-11-21 RX ADMIN — ASPIRIN 81 MG CHEWABLE TABLET 81 MG: 81 TABLET CHEWABLE at 07:33

## 2021-11-21 RX ADMIN — BUPROPION HYDROCHLORIDE 150 MG: 150 TABLET, EXTENDED RELEASE ORAL at 07:33

## 2021-11-21 RX ADMIN — LEVOTHYROXINE SODIUM 112 MCG: 0.11 TABLET ORAL at 04:14

## 2021-11-21 RX ADMIN — DICYCLOMINE HYDROCHLORIDE 20 MG: 20 TABLET ORAL at 04:14

## 2021-11-21 RX ADMIN — ONDANSETRON 4 MG: 2 INJECTION INTRAMUSCULAR; INTRAVENOUS at 04:14

## 2021-11-21 RX ADMIN — Medication 3 MG: at 00:18

## 2021-11-21 RX ADMIN — PANTOPRAZOLE SODIUM 40 MG: 40 TABLET, DELAYED RELEASE ORAL at 07:33

## 2021-11-21 RX ADMIN — ESCITALOPRAM OXALATE 10 MG: 10 TABLET ORAL at 07:33

## 2021-11-21 RX ADMIN — LAMOTRIGINE 200 MG: 100 TABLET ORAL at 07:33

## 2021-11-21 NOTE — PROGRESS NOTES
PT note: Attempted PT evaluation this am.  Patient lying in bed eating breakfast.  Patient reports she has been up on her own in the room without difficulty. She would not get up with therapy and did not want to sit in the chair to eat her breakfast.  She reports she is fine and she doesn't have time to get up because she is going down for an MRI shortly. Confirmed with RN that patient has been up in the room without issues. Chart review indicates symptoms have resolved and patient has an NIH of 0. Will d/c PT order at this time as no needs reported but asked RN to reorder if anything changed.       Ivon Mcdowell, PT

## 2021-11-21 NOTE — DISCHARGE INSTRUCTIONS

## 2021-11-21 NOTE — PROGRESS NOTES
[address] November 21, 2021       RE: Josephine Berumen      To Whom It May Concern,    This is to certify that Josephine Berumen was hospitalized at our facility, [facility], from 11/20-11/21. She may return to work on Wednesday 11/24/21 with no restrictions. Please feel free to contact my office if you have any questions or concerns.       Thanks,        Doug Muller MD

## 2021-11-21 NOTE — PROGRESS NOTES
Care Management Interventions  PCP Verified by CM: Yes  Support Systems: Other (Comment) (Has a friend who will be checking on her)  Discharge Location  Discharge Placement: Home   55 F with sudden onset facial droop. Possible Ativan withdrawal.  She has been working with her Psychiatrist as an outpatient on tapering off Ativan. Seen by Tele-neurology. Symptoms have resolved. PT has been discharged, no f/u PT needed. Pt will follow up with PCP and her Psychiatrist.  Pt lives alone, has a friend who will be checking on her.

## 2021-11-21 NOTE — DISCHARGE SUMMARY
Hospitalist Discharge Summary   Admit Date:  2021  2:59 PM   DC Note date: 2021  Name:  Chava Hanks   Age:  55 y.o. Sex:  female  :  1965   MRN:  [id number]   Room:  [facility]  PCP:  Gia Franks MD    Presenting Complaint: Facial Droop    Initial Admission Diagnosis: Facial droop [R29.810]     Problem List for this Hospitalization:  Hospital Problems as of 2021 Date Reviewed: 2021          Codes Class Noted - Resolved POA    Generalized anxiety disorder ICD-10-CM: F41.1  ICD-9-CM: 300.02  2021 - Present Yes        RLS (restless legs syndrome) ICD-10-CM: G25.81  ICD-9-CM: 333.94  2018 - Present Yes        HTN (hypertension) ICD-10-CM: I10  ICD-9-CM: 401.9  2014 - Present Yes        Crohn's colitis ([facility] ICD-10-CM: K50.10  ICD-9-CM: 555.1  10/11/2013 - Present Yes        Hypothyroidism ICD-10-CM: E03.9  ICD-9-CM: 244.9  Unknown - Present Yes        * (Principal) RESOLVED: Facial droop ICD-10-CM: R29.810  ICD-9-CM: 781.94  2021 - 2021 Yes        RESOLVED: Hypokalemia ICD-10-CM: E87.6  ICD-9-CM: 276.8  2021 - 2021 Yes            Did Patient have Sepsis (YES OR NO): No.    Hospital Course:  Mrs. Selin Valdez is a pleasant 52/ y/o WF with a h/o anxiety, Crohn's disease, hypothyroidism, HTN and RLS who presented to the ER on  with concerns for a stroke. She was at work when she developed word finding difficulty and mentally felt \"disconnected\" from her environment. No LOC, extremity weakness, headaches, palpitations or syncope. There was question of L sided facial droop but she says she has some of that at baseline. Of note, she has been tapering her Ativan as an outpatient but the dose was abruptly decreased a few days ago after her refill dosing was written incorrectly. Code S called on arrival. CT, CTA, CTp are all unremarkable. K was low and replaced PO. Her labs otherwise were unremarkable.  She was seen by Tele-neurology, [facility] 0 and not given tPA. MRI showed chronic small vessel disease w/o evidence for acute ischemia. A1C normal, LDL 68, , Tchol 231. She is tolerating her diet and ambulating independently. No recurrence of symptoms. Her hospital course was otherwise unremarkable and she is medically stable for discharge home. Needs to f/u with PCP and Psychiatrist.     Disposition: Home or Self Care  Diet: ADULT DIET Regular; Low Fat/Low Chol/High Fiber/TREVOR  Code Status: Full Code    Follow Up Orders:  No orders of the defined types were placed in this encounter. Follow-up Information     Follow up With Specialties Details Why Bakari Morel MD Internal Medicine In 1 week Hospital follow up. TIA. [address] [id number]  [phone]            Follow up labs/diagnostics (ultimately defer to outpatient provider):  N/A    Time spent in patient discharge and coordination 35 minutes. Plan was discussed with patient, RN, CM. All questions answered. Patient was stable at time of discharge. Instructions given to call a physician or return if any concerns. Discharge Info:   Current Discharge Medication List      START taking these medications    Details   atorvastatin (LIPITOR) 40 mg tablet Take 1 Tablet by mouth nightly. Qty: 30 Tablet, Refills: 0  Start date: 11/21/2021      aspirin 81 mg chewable tablet Take 1 Tablet by mouth daily. Qty: 30 Tablet, Refills: 0  Start date: 11/21/2021         CONTINUE these medications which have CHANGED    Details   LORazepam (ATIVAN) 1 mg tablet Take 1 Tablet by mouth two (2) times a day for 7 days. Max Daily Amount: 2 mg. Qty: 14 Tablet, Refills: 0  Start date: 11/21/2021, End date: 11/28/2021    Associated Diagnoses: Generalized anxiety disorder         CONTINUE these medications which have NOT CHANGED    Details   LATUDA 60 mg tab tablet Take 1 Tab by mouth nightly.   Qty: 90 Tab, Refills: 3    Associated Diagnoses: Bipolar depression ([address] butalbital-acetaminophen-caff (Fioricet) -40 mg per capsule Take 1-2 Caps by mouth every six (6) hours as needed for Headache. Qty: 20 Cap, Refills: 0      [place]iberty 0.05 mg/24 hr APPLY ONE PATCH TO THE SKIN TWICE A WEEK  Qty: 24 Patch, Refills: 1      Liberty 0.1 mg/24 hr APPLY ONE PATCH TO THE SKIN TWICE A WEEK  Qty: 24 Patch, Refills: 1      pantoprazole (PROTONIX) 40 mg tablet TAKE ONE TABLET BY MOUTH ONE TIME DAILY  Qty: 90 Tab, Refills: 2    Associated Diagnoses: Gastroesophageal reflux disease without esophagitis      levothyroxine (SYNTHROID) 112 mcg tablet TAKE ONE TABLET BY MOUTH ONE TIME DAILY BEFORE BREAKFAST  Qty: 90 Tab, Refills: 2    Associated Diagnoses: Hypothyroidism due to acquired atrophy of thyroid      miconazole (MONISTAT 7) 2 % vaginal cream Insert 1 Applicator into vagina nightly. Qty: 45 g, Refills: 0      buPROPion XL (WELLBUTRIN XL) 150 mg tablet Take 150 mg by mouth daily. amLODIPine (NORVASC) 5 mg tablet TAKE ONE TABLET BY MOUTH ONE TIME DAILY  Qty: 90 Tab, Refills: 3    Associated Diagnoses: Essential hypertension with goal blood pressure less than 130/85      ergocalciferol (ERGOCALCIFEROL) 1,250 mcg (50,000 unit) capsule Take 1 Cap by mouth every seven (7) days. Qty: 12 Cap, Refills: 3    Associated Diagnoses: Vitamin D deficiency      tolterodine ER (DETROL LA) 4 mg ER capsule TAKE ONE CAPSULE BY MOUTH ONE TIME DAILY  Qty: 90 Cap, Refills: 3    Associated Diagnoses: Urge incontinence of urine      lamoTRIgine (LAMICTAL) 200 mg tablet Take 1 Tab by mouth two (2) times a day. Qty: 180 Tab, Refills: 3    Associated Diagnoses: Bipolar depression ([address]      vedolizumab (ENTYVIO) 300 mg injection 300 mg by IntraVENous route once. Indications: Crohn's Disease, every 8 weeks      escitalopram oxalate (LEXAPRO) 10 mg tablet Take 10 mg by mouth daily. dicyclomine (BENTYL) 20 mg tablet Take 20 mg by mouth every six (6) hours.       B complx-C-folic-zinc-copper-E (STRESS B-COMPLEX) 500 mg-400 mcg- 24 mg-3 mg tab Take  by mouth.      suvorexant (BELSOMRA) 15 mg tablet Take  by mouth nightly as needed for Insomnia. Procedures done this admission:  * No surgery found *    Consults this admission:  None    Echocardiogram/EKG results:  No results found for this or any previous visit. EKG Results     Procedure 720 Value Units Date/Time    Initial ECG [[id number]]     Order Status: Sent     Initial ECG [[id number]]     Order Status: Sent           Diagnostic Imaging/Tests:   MRI BRAIN WO CONT    Result Date: 11/21/2021  MRI brain without contrast History: Acute onset dizziness, facial droop. Imaging sequences: Sagittal short TR/short TE, axial short TR/short TE, long TR/long TE, FLAIR, gradient recall, diffusion weighted images and ADC mapping. Coronal FLAIR. Imaging was performed on a 1.5 [place] magnet. Comparison: None. Correlation is made to the CT scan of the brain and CT perfusion study 11/20/2021. Findings: The ventricles are normal in size and configuration. There are no extra-axial fluid collections. Normal flow voids are present within all of the major intracranial vessels. No evidence of intraparenchymal hemorrhage or mass effect is identified. There are no areas of restricted diffusion to suggest an acute or subacute infarction. Patchy and discrete foci of T2 prolongation are present within the supratentorial white matter. These are nonspecific findings but would be most compatible with mild chronic small vessel ischemic changes. The punctate foci can also be seen in those with chronic migraine headaches. The visualized mastoid air cells and paranasal sinuses are well pneumatized and aerated. 1. No evidence of acute infarction. 2. Scattered white matter signal abnormality is most likely representing chronic small vessel ischemic change.      CT PERF W CBF    Result Date: 11/20/2021  CT Perfusion Imaging INDICATION:  Dizziness onset [id number] states unable to smile -Code Stroke CT perfusion imaging of the brain was performed after the administration of intravenous contrast.  Perfusion maps and perfusion analysis output were generated using the Intelligent Fingerprint[employer] perfusion processing software algorithm. Radiation dose reduction techniques were used for this study: All CT scans performed at this facility use one or all of the following: Automated exposure control, adjustment of the mA and/or kVp according to patient's size, iterative reconstruction. COMPARISON: None. Correlation is made to the CT scan of the brain and CTA on the same day. [email or web address] Output Values: CBF < 30% volume:  0 ml   (core infarction volume greater than 50 cc associated with poor outcomes) Tmax > 6 seconds: 0 ml Tmax/CBF Mismatch Volume: 0 ml Tmax/CBF Mismatch Ratio: N/A Hypoperfusion Intensity Ratio (Tmax > 10 seconds/Tmax > 6 seconds): 0   (values greater than 0.5 associated with poor outcome) Tmax > 10 seconds Volume: 0 ml   (volume greater than 100 mL is associated with poor outcome) There is Tmax>4 seconds of 57 mL within the left occipital and parietal regions. The findings may represent changes of chronic oligemia. No CT evidence of acute perfusion abnormality. Please note that the determination of patient treatment is not based solely upon imaging factors or calculation values. Management of ischemia is at the discretion of the primary physician and is based upon a combination of clinical and imaging data, along other factors. CTA CODE NEURO HEAD AND NECK W CONT    Result Date: 11/20/2021  History: Dizziness onset at 12:30. Confusion. Comments: CT ANGIOGRAM OF THE NECK AND CT ANGIOGRAM OF THE [place] was obtained following the administration of IV contrast. IV contrast was administered to evaluate the arterial vasculature. Reformatted images in the coronal and sagittal planes as well as 3-D imaging was obtained and reviewed on a dedicated PACS and [address].   Radiation reduction dose techniques were used for the study. Our CT scanner use one or all of the following- Automated exposure control, adjustment of the mA and/or KV according the patient size, iterative reconstruction. All measurements are based upon NASCET criteria if appropriate. Findings: CT ANGIOGRAM OF THE NECK: The arch and proximal great vessels are patent. There is tortuosity of the common carotid arteries. The carotid bulbs are patent without stenosis. The proximal vertebral arteries are patent. The lung apices are clear. Thyroid gland is unremarkable. Degenerative changes are noted in the cervical spine. Plating of the left clavicle is noted. CT angiogram of [place]: The petrous, cavernous, and supraclinoid internal carotid arteries are patent. The anterior and middle cerebral arteries are patent. Her graft there is a 1.4 mm outpouching of contrast near the left carotid terminus. Prominent infundibulum of the left posterior communicating artery. The distal vertebral arteries basilar artery and posterior cerebral arteries are patent. The posterior inferior cerebellar arteries as well as the superior cerebellar arteries are patent. The anterior-inferior cerebellar arteries are too small to evaluate. Dural venous sinuses are patent however the left transverse sinus is diminutive however asymmetry is a common finding when evaluating the dural venous sinuses. 1. No evidence of large vessel occlusive disease or high-grade stenosis. CT CODE NEURO HEAD WO CONTRAST    Result Date: 11/20/2021  NONCONTRAST HEAD CT CLINICAL HISTORY:  Code stroke with dizziness and decreased level of consciousness since 1230 hours today. TECHNIQUE:  Axial images were obtained with spiral technique. Radiation dose reduction was achieved using one or all of the following techniques: automated exposure control, weight-based dosing, iterative reconstruction. COMPARISON:  September 29, 2020.  REPORT:   Standard noncontrast head CT demonstrates no definite intracranial mass effect, hemorrhage, or evidence of acute geographic infarction. The ventricles are normal in size and configuration, accounting for the patient's age. Orbits  and paranasal sinuses are clear where imaged. Bone windows demonstrate no definite fracture or destruction. NO ACUTE INTRACRANIAL ABNORMALITY IDENTIFIED AT NONCONTRAST CT. All Micro Results     None          Labs: Results:       BMP, Mg, Phos Recent Labs     11/20/21  1512   *   K 3.1*   CL 98   CO2 27   AGAP 8   BUN 13   CREA 1.14*   CA 8.4   *      CBC Recent Labs     11/20/21  1512   WBC 11.9*   RBC 3.98*   HGB 10.5*   HCT 34.0*   *   GRANS 66   LYMPH 24   EOS 1   MONOS 8   BASOS 1   IG 1   ANEU 7.8   ABL 2.9   VIDAL 0.1   ABM 0.9   ABB 0.1   AIG 0.1      LFT No results for input(s): ALT, TBIL, AP, TP, ALB, GLOB, AGRAT in the last 72 hours.     No lab exists for component: SGOT, GPT   Cardiac Testing No results found for: BNPP, BNP, CPK, RCK1, RCK2, RCK3, RCK4, CKMB, CKNDX, CKND1, TROPT, TROIQ   Coagulation Tests Lab Results   Component Value Date/Time    Prothrombin time 11.9 (L) 11/20/2021 03:12 PM    Prothrombin time 9.8 08/10/2015 12:23 PM    Prothrombin time 10.5 11/03/2012 10:59 AM    INR 0.8 11/20/2021 03:12 PM    INR 0.9 08/10/2015 12:23 PM    INR 1.0 11/03/2012 10:59 AM    aPTT 27.8 08/10/2015 12:23 PM    aPTT 28.1 11/03/2012 10:59 AM    aPTT 26.1 04/13/2012 04:58 AM      A1c Lab Results   Component Value Date/Time    Hemoglobin A1c 5.6 11/21/2021 03:36 AM    Hemoglobin A1c 5.3 04/09/2012 03:30 AM      Lipid Panel Lab Results   Component Value Date/Time    Cholesterol, total 231 11/21/2021 03:36 AM    HDL Cholesterol 114 (H) 11/21/2021 03:36 AM    LDL, calculated 68.4 11/21/2021 03:36 AM    VLDL, calculated 48.6 (H) 11/21/2021 03:36 AM    Triglyceride 243 (H) 11/21/2021 03:36 AM    CHOL/HDL Ratio 2.0 11/21/2021 03:36 AM      Thyroid Panel Lab Results   Component Value Date/Time TSH 5.960 (H) 05/21/2019 02:59 PM    TSH 27.050 (H) 04/16/2019 02:39 PM    T4, Total 8.3 09/29/2015 09:40 AM    T4, Total 7.9 09/05/2014 09:18 AM        Most Recent UA Lab Results   Component Value Date/Time    Color YELLOW 05/31/2012 04:20 AM    Appearance CLEAR 05/31/2012 04:20 AM    Specific gravity 1.014 05/31/2012 04:20 AM    pH (UA) 5.0 05/31/2012 04:20 AM    Protein NEGATIVE  05/31/2012 04:20 AM    Glucose NEGATIVE  05/31/2012 04:20 AM    Ketone NEGATIVE  05/31/2012 04:20 AM    Bilirubin NEGATIVE  05/31/2012 04:20 AM    Blood NEGATIVE  05/31/2012 04:20 AM    Urobilinogen 0.2 05/31/2012 04:20 AM    Nitrites NEGATIVE  05/31/2012 04:20 AM    Leukocyte Esterase NEGATIVE  05/31/2012 04:20 AM    WBC 0-4 05/31/2012 04:20 AM    RBC 0-3 05/31/2012 04:20 AM    Bacteria NEGATIVE  05/31/2012 04:20 AM          All Labs from Last 24 Hrs:  Recent Results (from the past 24 hour(s))   GLUCOSE, POC    Collection Time: 11/20/21  3:02 PM   Result Value Ref Range    Glucose (POC) 109 (H) 65 - 100 mg/dL    Performed by Dian    CBC WITH AUTOMATED DIFF    Collection Time: 11/20/21  3:12 PM   Result Value Ref Range    WBC 11.9 (H) 4.3 - 11.1 K/uL    RBC 3.98 (L) 4.05 - 5.2 M/uL    HGB 10.5 (L) 11.7 - 15.4 g/dL    HCT 34.0 (L) 35.8 - 46.3 %    MCV 85.4 79.6 - 97.8 FL    MCH 26.4 26.1 - 32.9 PG    MCHC 30.9 (L) 31.4 - 35.0 g/dL    RDW 17.1 (H) 11.9 - 14.6 %    PLATELET 841 (H) 514 - 450 K/uL    MPV 8.1 (L) 9.4 - 12.3 FL    ABSOLUTE NRBC 0.00 0.0 - 0.2 K/uL    DF AUTOMATED      NEUTROPHILS 66 43 - 78 %    LYMPHOCYTES 24 13 - 44 %    MONOCYTES 8 4.0 - 12.0 %    EOSINOPHILS 1 0.5 - 7.8 %    BASOPHILS 1 0.0 - 2.0 %    IMMATURE GRANULOCYTES 1 0.0 - 5.0 %    ABS. NEUTROPHILS 7.8 1.7 - 8.2 K/UL    ABS. LYMPHOCYTES 2.9 0.5 - 4.6 K/UL    ABS. MONOCYTES 0.9 0.1 - 1.3 K/UL    ABS. EOSINOPHILS 0.1 0.0 - 0.8 K/UL    ABS. BASOPHILS 0.1 0.0 - 0.2 K/UL    ABS. IMM.  GRANS. 0.1 0.0 - 0.5 K/UL   METABOLIC PANEL, BASIC    Collection Time: 11/20/21  3:12 PM   Result Value Ref Range    Sodium 133 (L) 136 - 145 mmol/L    Potassium 3.1 (L) 3.5 - 5.1 mmol/L    Chloride 98 98 - 107 mmol/L    CO2 27 21 - 32 mmol/L    Anion gap 8 7 - 16 mmol/L    Glucose 106 (H) 65 - 100 mg/dL    BUN 13 6 - 23 MG/DL    Creatinine 1.14 (H) 0.6 - 1.0 MG/DL    GFR est AA >60 >60 ml/min/1.73m2    GFR est non-AA 53 (L) >60 ml/min/1.73m2    Calcium 8.4 8.3 - 10.4 MG/DL   PROTHROMBIN TIME + INR    Collection Time: 11/20/21  3:12 PM   Result Value Ref Range    Prothrombin time 11.9 (L) 12.6 - 14.5 sec    INR 0.8     TROPONIN-HIGH SENSITIVITY    Collection Time: 11/20/21  3:12 PM   Result Value Ref Range    Troponin-High Sensitivity 6.1 0 - 14 pg/mL   LIPID PANEL    Collection Time: 11/21/21  3:36 AM   Result Value Ref Range    Cholesterol, total 231 MG/DL    Triglyceride 243 (H) 35 - 150 MG/DL    HDL Cholesterol 114 (H) 40 - 60 MG/DL    LDL, calculated 68.4 <100 MG/DL    VLDL, calculated 48.6 (H) 6.0 - 23.0 MG/DL    CHOL/HDL Ratio 2.0 <200     HEMOGLOBIN A1C WITH EAG    Collection Time: 11/21/21  3:36 AM   Result Value Ref Range    Hemoglobin A1c 5.6 4.20 - 6.30 %    Est. average glucose 114 mg/dL       Current Med List in Hospital:   Current Facility-Administered Medications   Medication Dose Route Frequency    melatonin tablet 3 mg  3 mg Oral QHS PRN    buPROPion SR (WELLBUTRIN SR) tablet 150 mg  150 mg Oral DAILY    escitalopram oxalate (LEXAPRO) tablet 10 mg  10 mg Oral DAILY    lamoTRIgine (LaMICtal) tablet 200 mg  200 mg Oral BID    levothyroxine (SYNTHROID) tablet 112 mcg  112 mcg Oral 6am    pantoprazole (PROTONIX) tablet 40 mg  40 mg Oral DAILY    sodium chloride (NS) flush 5-40 mL  5-40 mL IntraVENous Q8H    sodium chloride (NS) flush 5-40 mL  5-40 mL IntraVENous PRN    aspirin chewable tablet 81 mg  81 mg Oral DAILY    atorvastatin (LIPITOR) tablet 40 mg  40 mg Oral QHS    ondansetron (ZOFRAN) injection 4 mg  4 mg IntraVENous Q6H PRN    acetaminophen (TYLENOL) tablet 650 mg  650 mg Oral Q4H PRN    LORazepam (ATIVAN) tablet 1 mg  1 mg Oral BID    dicyclomine (BENTYL) tablet 20 mg  20 mg Oral Q6H       Allergies   Allergen Reactions    Levaquin [Levofloxacin] Palpitations     Immunization History   Administered Date(s) Administered    Influenza Vaccine 10/07/2013    Influenza Vaccine (>6 mo Afluria QUAD Vial 64589 (0.25 mL) / 13514 (0.5 mL)) 08/07/2019    Influenza Vaccine (Quad) PF (>6 Mo Flulaval, Fluarix, and >3 Yrs Afluria, Fluzone 31261) 11/17/2015, 09/26/2018    Influenza Vaccine Whole 10/18/2011    Pneumococcal Polysaccharide (PPSV-23) 11/17/2015    Tdap 06/07/2019       Recent Vital Data:  Patient Vitals for the past 24 hrs:   Temp Pulse Resp BP SpO2   11/21/21 0755 98.7 °F (37.1 °C) 65 18 (!) 144/78 97 %   11/21/21 0400  67      11/21/21 0315 98.5 °F (36.9 °C) 68 19 (!) 144/77 95 %   11/20/21 2345  73      11/20/21 2323 98.9 °F (37.2 °C) 70 13 (!) 144/75 95 %   11/20/21 2000  68      11/20/21 1931 99.5 °F (37.5 °C) 69 19 (!) 155/61 95 %   11/20/21 1805 98.7 °F (37.1 °C) 71 19 (!) 157/72 94 %   11/20/21 1642  73  (!) 159/76    11/20/21 1537     98 %   11/20/21 1528  73 18 (!) 179/83 100 %   11/20/21 1500 99.1 °F (37.3 °C) 83 16 (!) 198/92 99 %     Oxygen Therapy  O2 Sat (%): 97 % (11/21/21 0755)  Pulse via Oximetry: 71 beats per minute (11/20/21 1805)  O2 Device: None (Room air) (11/20/21 1805)    Estimated body mass index is 26.61 kg/m² as calculated from the following:    Height as of this encounter: 5' 8\" (1.727 m). Weight as of this encounter: 79.4 kg (175 lb). No intake or output data in the 24 hours ending 11/21/21 1020      Physical Exam:  General:    Well nourished. No overt distress  Head:  Normocephalic, atraumatic  Eyes:  Sclerae appear normal.  Pupils equally round. HENT:  Nares appear normal, no drainage. Moist mucous membranes  Neck:  No restricted ROM. Trachea midline  CV:   RRR. No m/r/g.   No JVD  Lungs: CTAB.  No wheezing, rhonchi, or rales. Even, unlabored  Abdomen:   Soft, nontender, nondistended. Extremities: Warm and dry. No cyanosis or clubbing. No edema. Skin:     No rashes. Normal coloration  Neuro:  CN II-XII grossly intact. Sensation intact. A/O x3. Strength is normal and symmetric in all extremities. Psych:  Normal mood and affect, slightly anxious. A/O x3. Signed:  Floridalma Lomeli MD    Part of this note may have been written by using a voice dictation software. The note has been proof read but may still contain some grammatical/other typographical errors.

## 2021-11-21 NOTE — PROGRESS NOTES
I have reviewed discharge instructions with the patient. The patient verbalized understanding. Follow up with psychiatry discussed. Written prescriptions given to patient.

## 2021-11-21 NOTE — PROGRESS NOTES
11/20/21 1930   Dual Skin Pressure Injury Assessment   Dual Skin Pressure Injury Assessment WDL   Skin Integumentary   Skin Integumentary (WDL) WDL    Pressure  Injury Documentation No Pressure Injury Noted-Pressure Ulcer Prevention Initiated

## 2021-11-30 VITALS
OXYGEN SATURATION: 95 % | HEIGHT: 68 IN | WEIGHT: 175 LBS | HEART RATE: 73 BPM | BODY MASS INDEX: 26.52 KG/M2 | TEMPERATURE: 98.7 F | DIASTOLIC BLOOD PRESSURE: 80 MMHG | RESPIRATION RATE: 18 BRPM | SYSTOLIC BLOOD PRESSURE: 132 MMHG

## 2022-03-18 PROBLEM — F41.1 GENERALIZED ANXIETY DISORDER: Status: ACTIVE | Noted: 2021-11-21

## 2022-03-18 PROBLEM — G25.81 RLS (RESTLESS LEGS SYNDROME): Status: ACTIVE | Noted: 2018-07-02

## 2022-07-19 ENCOUNTER — HOSPITAL ENCOUNTER (EMERGENCY)
Dept: CT IMAGING | Age: 57
Discharge: HOME | End: 2022-07-22
Payer: COMMERCIAL

## 2022-07-19 ENCOUNTER — HOSPITAL ENCOUNTER (EMERGENCY)
Age: 57
Discharge: HOME | End: 2022-07-19
Attending: EMERGENCY MEDICINE
Payer: COMMERCIAL

## 2022-07-19 VITALS
SYSTOLIC BLOOD PRESSURE: 135 MMHG | BODY MASS INDEX: 25.76 KG/M2 | WEIGHT: 170 LBS | HEART RATE: 82 BPM | TEMPERATURE: 98.4 F | DIASTOLIC BLOOD PRESSURE: 69 MMHG | OXYGEN SATURATION: 97 % | HEIGHT: 68 IN | RESPIRATION RATE: 18 BRPM

## 2022-07-19 DIAGNOSIS — R10.9 ABDOMINAL PAIN, UNSPECIFIED ABDOMINAL LOCATION: Primary | ICD-10-CM

## 2022-07-19 LAB
ALBUMIN SERPL-MCNC: 3.8 G/DL (ref 3.5–5)
ALBUMIN/GLOB SERPL: 0.9 {RATIO} (ref 1.2–3.5)
ALP SERPL-CCNC: 96 U/L (ref 50–136)
ALT SERPL-CCNC: 18 U/L (ref 12–65)
ANION GAP SERPL CALC-SCNC: 6 MMOL/L (ref 7–16)
AST SERPL-CCNC: 15 U/L (ref 15–37)
BASOPHILS # BLD: 0.1 K/UL (ref 0–0.2)
BASOPHILS NFR BLD: 1 % (ref 0–2)
BILIRUB SERPL-MCNC: 0.2 MG/DL (ref 0.2–1.1)
BUN SERPL-MCNC: 15 MG/DL (ref 6–23)
CALCIUM SERPL-MCNC: 8.9 MG/DL (ref 8.3–10.4)
CHLORIDE SERPL-SCNC: 102 MMOL/L (ref 98–107)
CO2 SERPL-SCNC: 29 MMOL/L (ref 21–32)
CREAT SERPL-MCNC: 1.4 MG/DL (ref 0.6–1)
DIFFERENTIAL METHOD BLD: ABNORMAL
EOSINOPHIL # BLD: 0.2 K/UL (ref 0–0.8)
EOSINOPHIL NFR BLD: 2 % (ref 0.5–7.8)
ERYTHROCYTE [DISTWIDTH] IN BLOOD BY AUTOMATED COUNT: 16 % (ref 11.9–14.6)
GLOBULIN SER CALC-MCNC: 4.3 G/DL (ref 2.3–3.5)
GLUCOSE SERPL-MCNC: 119 MG/DL (ref 65–100)
HCT VFR BLD AUTO: 36.4 % (ref 35.8–46.3)
HGB BLD-MCNC: 11.5 G/DL (ref 11.7–15.4)
IMM GRANULOCYTES # BLD AUTO: 0 K/UL (ref 0–0.5)
IMM GRANULOCYTES NFR BLD AUTO: 0 % (ref 0–5)
LIPASE SERPL-CCNC: 161 U/L (ref 73–393)
LYMPHOCYTES # BLD: 2.1 K/UL (ref 0.5–4.6)
LYMPHOCYTES NFR BLD: 18 % (ref 13–44)
MCH RBC QN AUTO: 28.8 PG (ref 26.1–32.9)
MCHC RBC AUTO-ENTMCNC: 31.6 G/DL (ref 31.4–35)
MCV RBC AUTO: 91.2 FL (ref 79.6–97.8)
MONOCYTES # BLD: 0.7 K/UL (ref 0.1–1.3)
MONOCYTES NFR BLD: 6 % (ref 4–12)
NEUTS SEG # BLD: 8.5 K/UL (ref 1.7–8.2)
NEUTS SEG NFR BLD: 73 % (ref 43–78)
NRBC # BLD: 0 K/UL (ref 0–0.2)
PLATELET # BLD AUTO: 477 K/UL (ref 150–450)
PMV BLD AUTO: 8.1 FL (ref 9.4–12.3)
POTASSIUM SERPL-SCNC: 3.8 MMOL/L (ref 3.5–5.1)
PROT SERPL-MCNC: 8.1 G/DL (ref 6.3–8.2)
RBC # BLD AUTO: 3.99 M/UL (ref 4.05–5.2)
SODIUM SERPL-SCNC: 137 MMOL/L (ref 136–145)
WBC # BLD AUTO: 11.7 K/UL (ref 4.3–11.1)

## 2022-07-19 PROCEDURE — 6360000002 HC RX W HCPCS: Performed by: EMERGENCY MEDICINE

## 2022-07-19 PROCEDURE — 74176 CT ABD & PELVIS W/O CONTRAST: CPT

## 2022-07-19 PROCEDURE — 80053 COMPREHEN METABOLIC PANEL: CPT

## 2022-07-19 PROCEDURE — 85025 COMPLETE CBC W/AUTO DIFF WBC: CPT

## 2022-07-19 PROCEDURE — 96374 THER/PROPH/DIAG INJ IV PUSH: CPT

## 2022-07-19 PROCEDURE — 83690 ASSAY OF LIPASE: CPT

## 2022-07-19 PROCEDURE — 96375 TX/PRO/DX INJ NEW DRUG ADDON: CPT

## 2022-07-19 PROCEDURE — 99284 EMERGENCY DEPT VISIT MOD MDM: CPT

## 2022-07-19 RX ORDER — KETOROLAC TROMETHAMINE 10 MG/1
10 TABLET, FILM COATED ORAL EVERY 6 HOURS PRN
Qty: 20 TABLET | Refills: 0 | Status: SHIPPED | OUTPATIENT
Start: 2022-07-19

## 2022-07-19 RX ORDER — DICYCLOMINE HYDROCHLORIDE 10 MG/1
10 CAPSULE ORAL EVERY 6 HOURS PRN
Qty: 20 CAPSULE | Refills: 0 | Status: SHIPPED | OUTPATIENT
Start: 2022-07-19 | End: 2022-07-24

## 2022-07-19 RX ORDER — MORPHINE SULFATE 4 MG/ML
4 INJECTION, SOLUTION INTRAMUSCULAR; INTRAVENOUS
Status: COMPLETED | OUTPATIENT
Start: 2022-07-19 | End: 2022-07-19

## 2022-07-19 RX ORDER — ONDANSETRON 2 MG/ML
4 INJECTION INTRAMUSCULAR; INTRAVENOUS ONCE
Status: COMPLETED | OUTPATIENT
Start: 2022-07-19 | End: 2022-07-19

## 2022-07-19 RX ORDER — ONDANSETRON 4 MG/1
4 TABLET, ORALLY DISINTEGRATING ORAL 3 TIMES DAILY PRN
Qty: 21 TABLET | Refills: 0 | Status: SHIPPED | OUTPATIENT
Start: 2022-07-19

## 2022-07-19 RX ADMIN — MORPHINE SULFATE 4 MG: 4 INJECTION INTRAVENOUS at 18:28

## 2022-07-19 RX ADMIN — ONDANSETRON 4 MG: 2 INJECTION INTRAMUSCULAR; INTRAVENOUS at 18:28

## 2022-07-19 ASSESSMENT — PAIN DESCRIPTION - LOCATION
LOCATION: ABDOMEN
LOCATION: ABDOMEN

## 2022-07-19 ASSESSMENT — PAIN DESCRIPTION - DESCRIPTORS: DESCRIPTORS: ACHING;CRAMPING;STABBING

## 2022-07-19 ASSESSMENT — PAIN DESCRIPTION - PAIN TYPE: TYPE: ACUTE PAIN

## 2022-07-19 ASSESSMENT — PAIN SCALES - GENERAL
PAINLEVEL_OUTOF10: 6
PAINLEVEL_OUTOF10: 6

## 2022-07-19 ASSESSMENT — PAIN - FUNCTIONAL ASSESSMENT: PAIN_FUNCTIONAL_ASSESSMENT: 0-10

## 2022-07-19 ASSESSMENT — ENCOUNTER SYMPTOMS: ABDOMINAL PAIN: 1

## 2022-07-19 NOTE — ED PROVIDER NOTES
[employer] Emergency Department Provider Note                   PCP:                Sonali Del Rio MD               Age: 65 y.o. Sex: female     No diagnosis found. DISPOSITION         MDM  Number of Diagnoses or Management Options  Abdominal pain, unspecified abdominal location  Diagnosis management comments: Patient's labs and CT is unremarkable. Patient has no UTI urine dip. We will DC home and outpatient follow-up with PCP. Amount and/or Complexity of Data Reviewed  Clinical lab tests: ordered and reviewed  Tests in the radiology section of CPT®: ordered and reviewed  Review and summarize past medical records: yes  Independent visualization of images, tracings, or specimens: yes    Risk of Complications, Morbidity, and/or Mortality  Presenting problems: moderate  Diagnostic procedures: moderate  Management options: moderate    Patient Progress  Patient progress: stable       Orders Placed This Encounter   Procedures    CT ABDOMEN PELVIS WO CONTRAST Additional Contrast? None    CBC with Diff    CMP    Lipase    Diet NPO    POCT Urine Dipstick    Saline lock IV        Elizabeth Santiago is a 65 y.o. female who presents to the Emergency Department with chief complaint of    Chief Complaint   Patient presents with    Fatigue    Diarrhea    Abdominal Pain      Patient is a 52-year-old female with a history of Crohn's colitis who presents with lower abdominal pain bilateral for the last 5 days. Patient states she has had generalized weakness and fatigue for the last 10 days. Patient has had nausea but no vomiting or diarrhea. Patient denies any abdominal distention and denies any cold cough or congestion or shortness of breath. Patient has had a cholecystectomy and appendectomy in the past.    The history is provided by the patient.    Abdominal Pain  Pain location:  LLQ and RLQ  Pain quality: aching and dull    Pain radiates to:  Does not radiate  Pain severity:  Mild  Onset quality: Gradual  Duration:  5 days  Timing:  Intermittent  Progression:  Waxing and waning  Chronicity:  Recurrent  Context: previous surgery    Context: not alcohol use and not medication withdrawal    Relieved by:  Nothing  Worsened by:  Nothing  Ineffective treatments:  None tried  Associated symptoms: no anorexia and no dysuria    Risk factors: no alcohol abuse and has not had multiple surgeries      All other systems reviewed and are negative. Review of Systems   Gastrointestinal:  Positive for abdominal pain. Negative for anorexia. Genitourinary:  Negative for dysuria. All other systems reviewed and are negative.     Past Medical History:   Diagnosis Date    Bronchitis, acute     COPD (chronic obstructive pulmonary disease) ([facility] 10/1/2015    Crohn disease ([facility]     Gastrointestinal disorder     crohns disease    HTN (hypertension) 2/6/2014    controlled with meds    Hypothyroidism     Other ill-defined conditions(799.89) 02/16/2014    cellulitis of right arm/elbow from spider bite    Other ill-defined conditions(799.89)     hypothyroid    Psychiatric disorder     depression, anxiety    Seizures ([facility]     r/t low sodium    Thyroid disease         Past Surgical History:   Procedure Laterality Date    APPENDECTOMY  2008    BREAST REDUCTION SURGERY Bilateral 1/20/2015    BREAST REDUCTION BILATERAL performed by Juan Meehan MD at [address] [facility] (CERVIX STATUS UNKNOWN)  2008    ORTHOPEDIC SURGERY      l clavicle    SHOULDER ARTHROSCOPY  2010        Family History   Problem Relation Age of Onset    Heart Disease Mother     Cancer Father         bladder cancer 2010    Heart Disease Brother     Breast Cancer Neg Hx     Thyroid Disease Brother     Other Other         Mother ? 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 Exposure history: + Tobacco use, she has had parakeets, parrot and cockatiel in the past.  Used hot tubs frequently for a period of 5 years. She has not lived in an endemic regions. No definite TB or asbestos exposure. Occasional EtOH use. She works         Allergies: Statins and Levofloxacin    Previous Medications    AMLODIPINE (NORVASC) 5 MG TABLET    TAKE ONE TABLET BY MOUTH ONE TIME DAILY    ASPIRIN 81 MG CHEWABLE TABLET    Take 81 mg by mouth daily    ATORVASTATIN (LIPITOR) 40 MG TABLET    Take 40 mg by mouth    BUPROPION (WELLBUTRIN XL) 150 MG EXTENDED RELEASE TABLET    Take 150 mg by mouth daily    BUTALBITAL-APAP-CAFFEINE -40 MG CAPS PER CAPSULE    Take 1-2 capsules by mouth every 6 hours as needed    DICYCLOMINE (BENTYL) 20 MG TABLET    Take 20 mg by mouth every 6 hours    ERGOCALCIFEROL (ERGOCALCIFEROL) 1.25 MG (07560 UT) CAPSULE    Take 50,000 Units by mouth every 7 days    ESCITALOPRAM (LEXAPRO) 10 MG TABLET    Take 10 mg by mouth daily    ESTRADIOL (JOSEMANUEL) 0.05 MG/24HR    APPLY ONE PATCH TO THE SKIN TWICE A WEEK    ESTRADIOL (JOSEMANUEL) 0.1 MG/24HR    APPLY ONE PATCH TO THE SKIN TWICE A WEEK    LAMOTRIGINE (LAMICTAL) 200 MG TABLET    Take 200 mg by mouth 2 times daily    LEVOTHYROXINE (SYNTHROID) 112 MCG TABLET    TAKE ONE TABLET BY MOUTH ONE TIME DAILY BEFORE BREAKFAST    LURASIDONE (LATUDA) 60 MG TABS TABLET    Take 60 mg by mouth    MICONAZOLE (MICOTIN) 2 % VAGINAL CREAM    Place 1 applicator vaginally    PANTOPRAZOLE (PROTONIX) 40 MG TABLET    TAKE ONE TABLET BY MOUTH ONE TIME DAILY    SUVOREXANT 15 MG TABS    Take by mouth. TOLTERODINE (DETROL LA) 4 MG EXTENDED RELEASE CAPSULE    TAKE ONE CAPSULE BY MOUTH ONE TIME DAILY    VEDOLIZUMAB (ENTYVIO) 300 MG INJECTION    Infuse 300 mg intravenously once        Vitals signs and nursing note reviewed. Patient Vitals for the past 4 hrs:   Temp Pulse Resp BP SpO2   07/19/22 1800 -- 82 16 (!) 145/67 96 %   07/19/22 1700 -- 86 17 (!) 151/62 96 %   07/19/22 1630 -- 86 -- 136/65 97 %   07/19/22 1600 -- 89 -- 138/63 96 %   07/19/22 1545 -- 90 -- 137/60 96 %   07/19/22 1530 98.4 °F (36.9 °C) 90 18 119/75 96 %          Physical Exam  Vitals and nursing note reviewed. Constitutional:       Appearance: Normal appearance. HENT:      Head: Normocephalic and atraumatic. Nose: Nose normal.      Mouth/Throat:      Mouth: Mucous membranes are dry. Pharynx: Oropharynx is clear. Eyes:      Extraocular Movements: Extraocular movements intact. Conjunctiva/sclera: Conjunctivae normal.      Pupils: Pupils are equal, round, and reactive to light. Cardiovascular:      Rate and Rhythm: Normal rate. Pulses: Normal pulses. Heart sounds: Normal heart sounds. Pulmonary:      Effort: Pulmonary effort is normal.   Abdominal:      General: Abdomen is flat. Bowel sounds are normal.      Palpations: Abdomen is soft. Tenderness: There is abdominal tenderness in the right lower quadrant and left lower quadrant. Musculoskeletal:         General: Normal range of motion. Cervical back: Normal range of motion and neck supple. Skin:     General: Skin is warm and dry. Capillary Refill: Capillary refill takes less than 2 seconds. Neurological:      General: No focal deficit present. Mental Status: She is alert and oriented to person, place, and time. Mental status is at baseline. Psychiatric:         Mood and Affect: Mood normal.         Behavior: Behavior normal.         Thought Content:  Thought content normal.         Judgment: Judgment normal. Procedures        Labs Reviewed   CBC WITH AUTO DIFFERENTIAL - Abnormal; Notable for the following components:       Result Value    WBC 11.7 (*)     RBC 3.99 (*)     Hemoglobin 11.5 (*)     RDW 16.0 (*)     Platelets 805 (*)     MPV 8.1 (*)     Segs Absolute 8.5 (*)     All other components within normal limits   COMPREHENSIVE METABOLIC PANEL - Abnormal; Notable for the following components:    Anion Gap 6 (*)     Glucose 119 (*)     CREATININE 1.40 (*)     GFR  50 (*)     GFR Non- 41 (*)     Globulin 4.3 (*)     Albumin/Globulin Ratio 0.9 (*)     All other components within normal limits   LIPASE        CT ABDOMEN PELVIS WO CONTRAST Additional Contrast? None   Final Result   1. Suspected sinusitis. Correlate with urinalysis. 2.  No evidence of bowel inflammation. Voice dictation software was used during the making of this note. This software is not perfect and grammatical and other typographical errors may be present. This note has not been completely proofread for errors.       Kalia Hernandez MD  07/19/22 [id number]

## 2022-07-19 NOTE — ED TRIAGE NOTES
Pt c/o 10 days abd cramping, fatigue, diarrhea, back pain, subjective fevers w chills and sweating. (-)cough, dyspnea, cp, sore throat  Hx Crohn's disease  A&Ox4

## 2023-04-22 ENCOUNTER — APPOINTMENT (OUTPATIENT)
Dept: CT IMAGING | Age: 58
End: 2023-04-22
Payer: COMMERCIAL

## 2023-04-22 ENCOUNTER — HOSPITAL ENCOUNTER (EMERGENCY)
Age: 58
Discharge: HOME | End: 2023-04-22
Attending: STUDENT IN AN ORGANIZED HEALTH CARE EDUCATION/TRAINING PROGRAM
Payer: COMMERCIAL

## 2023-04-22 VITALS
TEMPERATURE: 99.1 F | HEIGHT: 68 IN | DIASTOLIC BLOOD PRESSURE: 72 MMHG | OXYGEN SATURATION: 95 % | RESPIRATION RATE: 18 BRPM | SYSTOLIC BLOOD PRESSURE: 142 MMHG | HEART RATE: 80 BPM | BODY MASS INDEX: 28.79 KG/M2 | WEIGHT: 190 LBS

## 2023-04-22 DIAGNOSIS — R10.84 GENERALIZED ABDOMINAL PAIN: ICD-10-CM

## 2023-04-22 DIAGNOSIS — K50.911 CROHN'S DISEASE WITH RECTAL BLEEDING, UNSPECIFIED GASTROINTESTINAL TRACT LOCATION (HCC): Primary | ICD-10-CM

## 2023-04-22 LAB
ABO + RH BLD: NORMAL
ALBUMIN SERPL-MCNC: 4 G/DL (ref 3.5–5)
ALBUMIN/GLOB SERPL: 0.9 (ref 0.4–1.6)
ALP SERPL-CCNC: 135 U/L (ref 50–130)
ALT SERPL-CCNC: 21 U/L (ref 12–65)
ANION GAP SERPL CALC-SCNC: 5 MMOL/L (ref 2–11)
AST SERPL-CCNC: 14 U/L (ref 15–37)
BASOPHILS # BLD: 0.1 K/UL (ref 0–0.2)
BASOPHILS NFR BLD: 1 % (ref 0–2)
BILIRUB SERPL-MCNC: 0.2 MG/DL (ref 0.2–1.1)
BLOOD GROUP ANTIBODIES SERPL: NORMAL
BUN SERPL-MCNC: 21 MG/DL (ref 6–23)
CALCIUM SERPL-MCNC: 8.9 MG/DL (ref 8.3–10.4)
CHLORIDE SERPL-SCNC: 99 MMOL/L (ref 101–110)
CO2 SERPL-SCNC: 27 MMOL/L (ref 21–32)
CREAT SERPL-MCNC: 0.92 MG/DL (ref 0.6–1)
DIFFERENTIAL METHOD BLD: ABNORMAL
EOSINOPHIL # BLD: 0.6 K/UL (ref 0–0.8)
EOSINOPHIL NFR BLD: 5 % (ref 0.5–7.8)
ERYTHROCYTE [DISTWIDTH] IN BLOOD BY AUTOMATED COUNT: 12.1 % (ref 11.9–14.6)
GLOBULIN SER CALC-MCNC: 4.3 G/DL (ref 2.8–4.5)
GLUCOSE SERPL-MCNC: 116 MG/DL (ref 65–100)
HCT VFR BLD AUTO: 41.3 % (ref 35.8–46.3)
HGB BLD-MCNC: 13.6 G/DL (ref 11.7–15.4)
IMM GRANULOCYTES # BLD AUTO: 0.1 K/UL (ref 0–0.5)
IMM GRANULOCYTES NFR BLD AUTO: 0 % (ref 0–5)
INR PPP: 0.9
LYMPHOCYTES # BLD: 1.7 K/UL (ref 0.5–4.6)
LYMPHOCYTES NFR BLD: 14 % (ref 13–44)
MCH RBC QN AUTO: 32.3 PG (ref 26.1–32.9)
MCHC RBC AUTO-ENTMCNC: 32.9 G/DL (ref 31.4–35)
MCV RBC AUTO: 98.1 FL (ref 82–102)
MONOCYTES # BLD: 0.7 K/UL (ref 0.1–1.3)
MONOCYTES NFR BLD: 6 % (ref 4–12)
NEUTS SEG # BLD: 8.9 K/UL (ref 1.7–8.2)
NEUTS SEG NFR BLD: 74 % (ref 43–78)
NRBC # BLD: 0 K/UL (ref 0–0.2)
PLATELET # BLD AUTO: 400 K/UL (ref 150–450)
PMV BLD AUTO: 8.4 FL (ref 9.4–12.3)
POTASSIUM SERPL-SCNC: 3.7 MMOL/L (ref 3.5–5.1)
PROT SERPL-MCNC: 8.3 G/DL (ref 6.3–8.2)
PROTHROMBIN TIME: 11.9 SEC (ref 12.6–14.3)
RBC # BLD AUTO: 4.21 M/UL (ref 4.05–5.2)
SODIUM SERPL-SCNC: 131 MMOL/L (ref 133–143)
SPECIMEN EXP DATE BLD: NORMAL
WBC # BLD AUTO: 12 K/UL (ref 4.3–11.1)

## 2023-04-22 PROCEDURE — 80053 COMPREHEN METABOLIC PANEL: CPT

## 2023-04-22 PROCEDURE — 2580000003 HC RX 258

## 2023-04-22 PROCEDURE — 74177 CT ABD & PELVIS W/CONTRAST: CPT

## 2023-04-22 PROCEDURE — 86850 RBC ANTIBODY SCREEN: CPT

## 2023-04-22 PROCEDURE — 96375 TX/PRO/DX INJ NEW DRUG ADDON: CPT

## 2023-04-22 PROCEDURE — 86900 BLOOD TYPING SEROLOGIC ABO: CPT

## 2023-04-22 PROCEDURE — 96365 THER/PROPH/DIAG IV INF INIT: CPT

## 2023-04-22 PROCEDURE — 85610 PROTHROMBIN TIME: CPT

## 2023-04-22 PROCEDURE — 85025 COMPLETE CBC W/AUTO DIFF WBC: CPT

## 2023-04-22 PROCEDURE — 6360000004 HC RX CONTRAST MEDICATION

## 2023-04-22 PROCEDURE — 99285 EMERGENCY DEPT VISIT HI MDM: CPT

## 2023-04-22 PROCEDURE — 6360000002 HC RX W HCPCS

## 2023-04-22 PROCEDURE — 86901 BLOOD TYPING SEROLOGIC RH(D): CPT

## 2023-04-22 RX ORDER — KETOROLAC TROMETHAMINE 30 MG/ML
30 INJECTION, SOLUTION INTRAMUSCULAR; INTRAVENOUS ONCE
Status: COMPLETED | OUTPATIENT
Start: 2023-04-22 | End: 2023-04-22

## 2023-04-22 RX ORDER — 0.9 % SODIUM CHLORIDE 0.9 %
100 INTRAVENOUS SOLUTION INTRAVENOUS
Status: COMPLETED | OUTPATIENT
Start: 2023-04-22 | End: 2023-04-22

## 2023-04-22 RX ORDER — PREDNISONE 10 MG/1
TABLET ORAL
Qty: 70 TABLET | Refills: 0 | Status: SHIPPED | OUTPATIENT
Start: 2023-04-22 | End: 2023-05-20

## 2023-04-22 RX ADMIN — KETOROLAC TROMETHAMINE 30 MG: 30 INJECTION, SOLUTION INTRAMUSCULAR at 14:23

## 2023-04-22 RX ADMIN — IOPAMIDOL 100 ML: 755 INJECTION, SOLUTION INTRAVENOUS at 13:50

## 2023-04-22 RX ADMIN — PIPERACILLIN AND TAZOBACTAM 4500 MG: 4; .5 INJECTION, POWDER, LYOPHILIZED, FOR SOLUTION INTRAVENOUS at 14:32

## 2023-04-22 RX ADMIN — SODIUM CHLORIDE 100 ML: 9 INJECTION, SOLUTION INTRAVENOUS at 13:50

## 2023-04-22 ASSESSMENT — PAIN SCALES - GENERAL: PAINLEVEL_OUTOF10: 4

## 2023-04-22 ASSESSMENT — PAIN DESCRIPTION - LOCATION: LOCATION: ABDOMEN;BACK

## 2023-04-22 ASSESSMENT — PAIN - FUNCTIONAL ASSESSMENT: PAIN_FUNCTIONAL_ASSESSMENT: 0-10

## 2023-04-22 NOTE — ED PROVIDER NOTES
Emergency Department Provider Note                   PCP:                Candi Love MD               Age: 65 y.o. Sex: female     DISPOSITION Decision To Discharge 04/22/2023 03:13:35 PM       ICD-10-CM    1. Crohn's disease with rectal bleeding, unspecified gastrointestinal tract location ([facility]  K50.911       2. Generalized abdominal pain  R10.84           MEDICAL DECISION MAKING  Complexity of Problems Addressed:  Complexity of Problem: 1 chronic illness with exacerbation. Complexity of Problem: 1 acute illness with systemic symptoms. Data Reviewed and Analyzed:  Category 1:   I reviewed external records: ED visit note from an outside group. I reviewed external records: provider visit note from PCP. I reviewed external records: provider visit note from outside specialist.  I ordered each unique test.  I reviewed the results of each unique test.      Category 2:   I interpreted the CT Scan. And agree with the radiologist that there is no evidence of perforation, abscess formation. Category 3: Discussion of management or test interpretation. 65 old female with past medical history of Crohn's disease presents complaining of abdominal cramping, nausea, and bloody diarrhea likely due to Crohn's flare. On presentation, patient is afebrile, vital signs are stable, there is no evidence of tachycardia, hypotension, or any other evidence of hemodynamic instability. She exhibits mild diffuse tenderness to palpation of her abdomen without rebound, guarding, or distention. CBC reveals a mild leukocytosis but no evidence of anemia. CMP is unremarkable. Rectal exam revealed a small external nonthrombosed hemorrhoid at the 6 clock position but was otherwise unremarkable with no fissures. Internal rectal exam did reveal gross blood. Hemoccult was positive. CT abdomen pelvis was performed that revealed transverse colitis. We will begin IV antibiotics and reach out to [facility].    I

## 2023-04-22 NOTE — ED TRIAGE NOTES
Patient advises lower abdominal pain and lower back pain x 4 days, patient this morning with 3 episodes of passing bright red blood rectally today. Patient advises 1st episode with some diarrhea present as well however no stool 2nd and 3rd time.

## 2023-04-22 NOTE — ED NOTES
I have reviewed discharge instructions with the patient. The patient verbalized understanding. Patient left ED via Discharge Method: ambulatory to Home with self. Opportunity for questions and clarification provided. Patient given 1 scripts. To continue your aftercare when you leave the hospital, you may receive an automated call from our care team to check in on how you are doing. This is a free service and part of our promise to provide the best care and service to meet your aftercare needs.  If you have questions, or wish to unsubscribe from this service please call [phone]. Thank you for Choosing our [facility] Emergency Department.         Mary Kay Acuna RN  04/22/23 [id number]

## 2023-04-22 NOTE — DISCHARGE INSTRUCTIONS
You have been diagnosed with a Crohn's flare. Please begin taking the steroid taper at home. If you are able please return the stool cultures as soon as possible. Please follow-up with Dr. William Zapien early next week by calling his office.

## 2024-03-29 ENCOUNTER — HOSPITAL ENCOUNTER (OUTPATIENT)
Dept: CT IMAGING | Age: 59
Discharge: HOME | End: 2024-03-29
Attending: INTERNAL MEDICINE
Payer: COMMERCIAL

## 2024-03-29 DIAGNOSIS — R10.31 RIGHT LOWER QUADRANT PAIN: ICD-10-CM

## 2024-03-29 DIAGNOSIS — K50.10 CROHN'S DISEASE OF LARGE INTESTINE WITHOUT COMPLICATION (HCC): ICD-10-CM

## 2024-03-29 DIAGNOSIS — R11.0 NAUSEA: ICD-10-CM

## 2024-03-29 PROCEDURE — 6360000004 HC RX CONTRAST MEDICATION: Performed by: INTERNAL MEDICINE

## 2024-03-29 PROCEDURE — 74177 CT ABD & PELVIS W/CONTRAST: CPT

## 2024-03-29 RX ADMIN — DIATRIZOATE MEGLUMINE AND DIATRIZOATE SODIUM 15 ML: 660; 100 LIQUID ORAL; RECTAL at 11:00

## 2024-03-29 RX ADMIN — IOPAMIDOL 100 ML: 755 INJECTION, SOLUTION INTRAVENOUS at 11:00

## 2025-03-25 ENCOUNTER — APPOINTMENT (OUTPATIENT)
Dept: URBAN - METROPOLITAN AREA CLINIC 25 | Facility: CLINIC | Age: 60
Setting detail: DERMATOLOGY
End: 2025-03-25

## 2025-03-25 DIAGNOSIS — Z71.89 OTHER SPECIFIED COUNSELING: ICD-10-CM

## 2025-03-25 DIAGNOSIS — L73.8 OTHER SPECIFIED FOLLICULAR DISORDERS: ICD-10-CM

## 2025-03-25 DIAGNOSIS — L30.8 OTHER SPECIFIED DERMATITIS: ICD-10-CM | Status: INADEQUATELY CONTROLLED

## 2025-03-25 PROCEDURE — ? PRESCRIPTION MEDICATION MANAGEMENT

## 2025-03-25 PROCEDURE — ? COUNSELING

## 2025-03-25 PROCEDURE — 99204 OFFICE O/P NEW MOD 45 MIN: CPT

## 2025-03-25 PROCEDURE — ? PRESCRIPTION

## 2025-03-25 RX ORDER — CLOBETASOL PROPIONATE 0.5 MG/G
OINTMENT TOPICAL
Qty: 45 | Refills: 3 | Status: ERX | COMMUNITY
Start: 2025-03-25

## 2025-03-25 RX ORDER — MUPIROCIN 20 MG/G
OINTMENT TOPICAL
Qty: 15 | Refills: 3 | Status: ERX | COMMUNITY
Start: 2025-03-25

## 2025-03-25 RX ORDER — TRETIONIN 0.25 MG/G
CREAM TOPICAL
Qty: 45 | Refills: 5 | Status: ERX | COMMUNITY
Start: 2025-03-25

## 2025-03-25 RX ADMIN — MUPIROCIN: 20 OINTMENT TOPICAL at 00:00

## 2025-03-25 RX ADMIN — CLOBETASOL PROPIONATE: 0.5 OINTMENT TOPICAL at 00:00

## 2025-03-25 RX ADMIN — TRETIONIN: 0.25 CREAM TOPICAL at 00:00

## 2025-03-25 ASSESSMENT — LOCATION SIMPLE DESCRIPTION DERM
LOCATION SIMPLE: LEFT CHEEK
LOCATION SIMPLE: RIGHT INDEX FINGER
LOCATION SIMPLE: LEFT RING FINGER
LOCATION SIMPLE: RIGHT CHEEK
LOCATION SIMPLE: RIGHT FOREHEAD
LOCATION SIMPLE: LEFT MIDDLE FINGER
LOCATION SIMPLE: LEFT INDEX FINGER
LOCATION SIMPLE: RIGHT RING FINGER
LOCATION SIMPLE: RIGHT MIDDLE FINGER

## 2025-03-25 ASSESSMENT — LOCATION ZONE DERM
LOCATION ZONE: FINGER
LOCATION ZONE: FACE

## 2025-03-25 ASSESSMENT — LOCATION DETAILED DESCRIPTION DERM
LOCATION DETAILED: RIGHT DISTAL DORSAL MIDDLE FINGER
LOCATION DETAILED: RIGHT DISTAL DORSAL RING FINGER
LOCATION DETAILED: LEFT INFERIOR CENTRAL MALAR CHEEK
LOCATION DETAILED: RIGHT MEDIAL FOREHEAD
LOCATION DETAILED: LEFT DISTAL DORSAL MIDDLE FINGER
LOCATION DETAILED: RIGHT DISTAL DORSAL INDEX FINGER
LOCATION DETAILED: LEFT DISTAL DORSAL RING FINGER
LOCATION DETAILED: LEFT DISTAL DORSAL INDEX FINGER
LOCATION DETAILED: RIGHT CENTRAL MALAR CHEEK

## 2025-03-25 NOTE — PROCEDURE: MIPS QUALITY
Quality 226: Preventive Care And Screening: Tobacco Use: Screening And Cessation Intervention: Patient screened for tobacco use and is an ex/non-smoker
Quality 410: Psoriasis Clinical Response To Oral Systemic Or Biologic Medications: Documentation that the patient declined therapy change
Detail Level: Detailed

## 2025-03-25 NOTE — PROCEDURE: PRESCRIPTION MEDICATION MANAGEMENT
Initiate Treatment: clobetasol 0.05 % ointment BID until resolved\\nmupirocin 2 % ointment to open wounds and fissures 1-2x daily until resolved
Render In Strict Bullet Format?: No
Continue Regimen: Working hands moisturizing cream QD
Discontinue Regimen: Dial soap and persistent hand washing
Detail Level: Zone

## 2025-04-16 ENCOUNTER — HOSPITAL ENCOUNTER (EMERGENCY)
Age: 60
Discharge: HOME | End: 2025-04-16
Attending: EMERGENCY MEDICINE
Payer: COMMERCIAL

## 2025-04-16 VITALS
SYSTOLIC BLOOD PRESSURE: 135 MMHG | TEMPERATURE: 98.4 F | WEIGHT: 197 LBS | HEIGHT: 68 IN | RESPIRATION RATE: 10 BRPM | OXYGEN SATURATION: 95 % | HEART RATE: 84 BPM | DIASTOLIC BLOOD PRESSURE: 69 MMHG | BODY MASS INDEX: 29.86 KG/M2

## 2025-04-16 DIAGNOSIS — E87.1 HYPONATREMIA: ICD-10-CM

## 2025-04-16 DIAGNOSIS — R42 DIZZINESS: Primary | ICD-10-CM

## 2025-04-16 DIAGNOSIS — F32.A DEPRESSION, UNSPECIFIED DEPRESSION TYPE: ICD-10-CM

## 2025-04-16 LAB
ALBUMIN SERPL-MCNC: 3.9 G/DL (ref 3.5–5)
ALBUMIN/GLOB SERPL: 1.1 (ref 1–1.9)
ALP SERPL-CCNC: 100 U/L (ref 35–104)
ALT SERPL-CCNC: 13 U/L (ref 8–45)
ANION GAP SERPL CALC-SCNC: 11 MMOL/L (ref 7–16)
APPEARANCE UR: CLEAR
AST SERPL-CCNC: 17 U/L (ref 15–37)
BACTERIA URNS QL MICRO: ABNORMAL /HPF
BASOPHILS # BLD: 0.12 K/UL (ref 0–0.2)
BASOPHILS NFR BLD: 1.6 % (ref 0–2)
BILIRUB SERPL-MCNC: <0.2 MG/DL (ref 0–1.2)
BILIRUB UR QL: NEGATIVE
BUN SERPL-MCNC: 15 MG/DL (ref 6–23)
CALCIUM SERPL-MCNC: 9.3 MG/DL (ref 8.8–10.2)
CHLORIDE SERPL-SCNC: 95 MMOL/L (ref 98–107)
CO2 SERPL-SCNC: 24 MMOL/L (ref 20–29)
COLOR UR: ABNORMAL
CREAT SERPL-MCNC: 0.77 MG/DL (ref 0.6–1.1)
DIFFERENTIAL METHOD BLD: ABNORMAL
EKG ATRIAL RATE: 86 BPM
EKG DIAGNOSIS: NORMAL
EKG P AXIS: 72 DEGREES
EKG P-R INTERVAL: 137 MS
EKG Q-T INTERVAL: 362 MS
EKG QRS DURATION: 94 MS
EKG QTC CALCULATION (BAZETT): 433 MS
EKG R AXIS: -24 DEGREES
EKG T AXIS: 65 DEGREES
EKG VENTRICULAR RATE: 86 BPM
EOSINOPHIL # BLD: 0.39 K/UL (ref 0–0.8)
EOSINOPHIL NFR BLD: 5 % (ref 0.5–7.8)
EPI CELLS #/AREA URNS HPF: ABNORMAL /HPF
ERYTHROCYTE [DISTWIDTH] IN BLOOD BY AUTOMATED COUNT: 12.9 % (ref 11.9–14.6)
GLOBULIN SER CALC-MCNC: 3.7 G/DL (ref 2.3–3.5)
GLUCOSE SERPL-MCNC: 92 MG/DL (ref 70–99)
GLUCOSE UR STRIP.AUTO-MCNC: NEGATIVE MG/DL
HCT VFR BLD AUTO: 40.8 % (ref 35.8–46.3)
HGB BLD-MCNC: 13.4 G/DL (ref 11.7–15.4)
HGB UR QL STRIP: NEGATIVE
HYALINE CASTS URNS QL MICRO: ABNORMAL /LPF
IMM GRANULOCYTES # BLD AUTO: 0.03 K/UL (ref 0–0.5)
IMM GRANULOCYTES NFR BLD AUTO: 0.4 % (ref 0–5)
KETONES UR QL STRIP.AUTO: NEGATIVE MG/DL
LEUKOCYTE ESTERASE UR QL STRIP.AUTO: NEGATIVE
LIPASE SERPL-CCNC: 23 U/L (ref 13–60)
LYMPHOCYTES # BLD: 1.51 K/UL (ref 0.5–4.6)
LYMPHOCYTES NFR BLD: 19.5 % (ref 13–44)
MCH RBC QN AUTO: 32.8 PG (ref 26.1–32.9)
MCHC RBC AUTO-ENTMCNC: 32.8 G/DL (ref 31.4–35)
MCV RBC AUTO: 100 FL (ref 82–102)
MONOCYTES # BLD: 0.4 K/UL (ref 0.1–1.3)
MONOCYTES NFR BLD: 5.2 % (ref 4–12)
NEUTS SEG # BLD: 5.29 K/UL (ref 1.7–8.2)
NEUTS SEG NFR BLD: 68.3 % (ref 43–78)
NITRITE UR QL STRIP.AUTO: NEGATIVE
NRBC # BLD: 0 K/UL (ref 0–0.2)
PH UR STRIP: 5.5 (ref 5–9)
PLATELET # BLD AUTO: 333 K/UL (ref 150–450)
PMV BLD AUTO: 8.2 FL (ref 9.4–12.3)
POTASSIUM SERPL-SCNC: 4.6 MMOL/L (ref 3.5–5.1)
PROT SERPL-MCNC: 7.7 G/DL (ref 6.3–8.2)
PROT UR STRIP-MCNC: ABNORMAL MG/DL
RBC # BLD AUTO: 4.08 M/UL (ref 4.05–5.2)
RBC #/AREA URNS HPF: ABNORMAL /HPF
SODIUM SERPL-SCNC: 130 MMOL/L (ref 136–145)
SP GR UR REFRACTOMETRY: 1.01 (ref 1–1.02)
UROBILINOGEN UR QL STRIP.AUTO: 0.2 EU/DL (ref 0.2–1)
WBC # BLD AUTO: 7.7 K/UL (ref 4.3–11.1)
WBC URNS QL MICRO: ABNORMAL /HPF

## 2025-04-16 PROCEDURE — 93005 ELECTROCARDIOGRAM TRACING: CPT | Performed by: EMERGENCY MEDICINE

## 2025-04-16 PROCEDURE — 2580000003 HC RX 258: Performed by: EMERGENCY MEDICINE

## 2025-04-16 PROCEDURE — 81001 URINALYSIS AUTO W/SCOPE: CPT

## 2025-04-16 PROCEDURE — 80053 COMPREHEN METABOLIC PANEL: CPT

## 2025-04-16 PROCEDURE — 85025 COMPLETE CBC W/AUTO DIFF WBC: CPT

## 2025-04-16 PROCEDURE — 93010 ELECTROCARDIOGRAM REPORT: CPT | Performed by: INTERNAL MEDICINE

## 2025-04-16 PROCEDURE — 6360000002 HC RX W HCPCS: Performed by: EMERGENCY MEDICINE

## 2025-04-16 PROCEDURE — 99284 EMERGENCY DEPT VISIT MOD MDM: CPT

## 2025-04-16 PROCEDURE — 6370000000 HC RX 637 (ALT 250 FOR IP): Performed by: EMERGENCY MEDICINE

## 2025-04-16 PROCEDURE — 83690 ASSAY OF LIPASE: CPT

## 2025-04-16 PROCEDURE — 96374 THER/PROPH/DIAG INJ IV PUSH: CPT

## 2025-04-16 RX ORDER — ONDANSETRON 2 MG/ML
4 INJECTION INTRAMUSCULAR; INTRAVENOUS
Status: COMPLETED | OUTPATIENT
Start: 2025-04-16 | End: 2025-04-16

## 2025-04-16 RX ORDER — HYOSCYAMINE SULFATE 0.12 MG/1
0.25 TABLET SUBLINGUAL
Status: COMPLETED | OUTPATIENT
Start: 2025-04-16 | End: 2025-04-16

## 2025-04-16 RX ORDER — 0.9 % SODIUM CHLORIDE 0.9 %
1000 INTRAVENOUS SOLUTION INTRAVENOUS ONCE
Status: COMPLETED | OUTPATIENT
Start: 2025-04-16 | End: 2025-04-16

## 2025-04-16 RX ADMIN — SODIUM CHLORIDE 1000 ML: 0.9 INJECTION, SOLUTION INTRAVENOUS at 10:06

## 2025-04-16 RX ADMIN — ONDANSETRON 4 MG: 2 INJECTION, SOLUTION INTRAMUSCULAR; INTRAVENOUS at 10:07

## 2025-04-16 RX ADMIN — HYOSCYAMINE SULFATE 0.25 MG: 0.12 TABLET ORAL; SUBLINGUAL at 10:08

## 2025-04-16 ASSESSMENT — PAIN SCALES - GENERAL
PAINLEVEL_OUTOF10: 0
PAINLEVEL_OUTOF10: 1

## 2025-04-16 ASSESSMENT — PAIN DESCRIPTION - LOCATION: LOCATION: SHOULDER

## 2025-04-16 ASSESSMENT — ENCOUNTER SYMPTOMS
NAUSEA: 0
BACK PAIN: 0
FACIAL SWELLING: 0
COUGH: 0
EYE DISCHARGE: 0
COLOR CHANGE: 0
VOMITING: 0
ABDOMINAL PAIN: 0
RHINORRHEA: 0
EYE REDNESS: 0
SHORTNESS OF BREATH: 0

## 2025-04-16 ASSESSMENT — LIFESTYLE VARIABLES
HOW OFTEN DO YOU HAVE A DRINK CONTAINING ALCOHOL: 4 OR MORE TIMES A WEEK
HOW MANY STANDARD DRINKS CONTAINING ALCOHOL DO YOU HAVE ON A TYPICAL DAY: 1 OR 2

## 2025-04-16 ASSESSMENT — PAIN DESCRIPTION - ORIENTATION: ORIENTATION: LEFT

## 2025-04-16 ASSESSMENT — PAIN - FUNCTIONAL ASSESSMENT: PAIN_FUNCTIONAL_ASSESSMENT: 0-10

## 2025-04-16 NOTE — ED PROVIDER NOTES
Eosinophils Absolute 0.39 0.00 - 0.80 K/UL    Basophils Absolute 0.12 0.00 - 0.20 K/UL    Immature Granulocytes Absolute 0.03 0.0 - 0.5 K/UL   Comprehensive Metabolic Panel   Result Value Ref Range    Sodium 130 (L) 136 - 145 mmol/L    Potassium 4.6 3.5 - 5.1 mmol/L    Chloride 95 (L) 98 - 107 mmol/L    CO2 24 20 - 29 mmol/L    Anion Gap 11 7 - 16 mmol/L    Glucose 92 70 - 99 mg/dL    BUN 15 6 - 23 MG/DL    Creatinine 0.77 0.60 - 1.10 MG/DL    Est, Glom Filt Rate 89 >60 ml/min/1.73m2    Calcium 9.3 8.8 - 10.2 MG/DL    Total Bilirubin <0.2 0.0 - 1.2 MG/DL    ALT 13 8 - 45 U/L    AST 17 15 - 37 U/L    Alk Phosphatase 100 35 - 104 U/L    Total Protein 7.7 6.3 - 8.2 g/dL    Albumin 3.9 3.5 - 5.0 g/dL    Globulin 3.7 (H) 2.3 - 3.5 g/dL    Albumin/Globulin Ratio 1.1 1.0 - 1.9     Lipase   Result Value Ref Range    Lipase 23 13 - 60 U/L   Urinalysis   Result Value Ref Range    Color, UA YELLOW/STRAW      Appearance CLEAR      Specific Gravity, UA 1.014 1.001 - 1.023      pH, Urine 5.5 5.0 - 9.0      Protein, UA TRACE (A) NEG mg/dL    Glucose, Ur Negative NEG mg/dL    Ketones, Urine Negative NEG mg/dL    Bilirubin, Urine Negative NEG      Blood, Urine Negative NEG      Urobilinogen, Urine 0.2 0.2 - 1.0 EU/dL    Nitrite, Urine Negative NEG      Leukocyte Esterase, Urine Negative NEG      WBC, UA 0-4 U4 /hpf    RBC, UA 0-5 U5 /hpf    Epithelial Cells, UA 0-5 U5 /hpf    BACTERIA, URINE 1+ (A) NEG /hpf    Hyaline Casts, UA 2-5 /lpf   EKG 12 Lead   Result Value Ref Range    Ventricular Rate 86 BPM    Atrial Rate 86 BPM    P-R Interval 137 ms    QRS Duration 94 ms    Q-T Interval 362 ms    QTc Calculation (Bazett) 433 ms    P Axis 72 degrees    R Axis -24 degrees    T Axis 65 degrees    Diagnosis Sinus rhythm  Borderline left axis deviation            No orders to display                No results for input(s): \"COVID19\" in the last 72 hours.    Voice dictation software was used during the making of this note.  This software is

## 2025-04-16 NOTE — ED NOTES
Orthostatic Vitals:      4/16/2025    10:04 AM   Orthostatic Vitals   Orthostatic B/P and Pulse? Yes   Blood Pressure Lying 139/72   Pulse Lying 86 PER MINUTE   Blood Pressure Sitting 147/78   Pulse Sitting 92 PER MINUTE   Blood Pressure Standing 146/75   Pulse Standing 85 PER MINUTE     Pt dizzy with position change.  Also states she feels \"jittery\" with position change.

## 2025-04-16 NOTE — DISCHARGE INSTRUCTIONS
Talk to your doctors about choice of antidepressant, Lexapro and others like it, are well-known to lower your sodium.    You should get your sodium rechecked in a week or 2    Try the melatonin and magnesium glycine 8 again, together, to see if that helps with your sleep

## 2025-04-16 NOTE — ED NOTES
Patient mobility status  with no difficulty.     I have reviewed discharge instructions with the patient.  The patient verbalized understanding.    Patient left ED via Discharge Method: ambulatory to Home with  self via [employer] .    Opportunity for questions and clarification provided.     Patient given 0 scripts.

## 2025-05-27 ENCOUNTER — APPOINTMENT (OUTPATIENT)
Dept: URBAN - METROPOLITAN AREA CLINIC 25 | Facility: CLINIC | Age: 60
Setting detail: DERMATOLOGY
End: 2025-05-27

## 2025-05-27 DIAGNOSIS — L30.8 OTHER SPECIFIED DERMATITIS: ICD-10-CM | Status: IMPROVED

## 2025-05-27 PROCEDURE — ? PRESCRIPTION MEDICATION MANAGEMENT

## 2025-05-27 PROCEDURE — ? COUNSELING

## 2025-05-27 PROCEDURE — ? PRESCRIPTION

## 2025-05-27 PROCEDURE — 99214 OFFICE O/P EST MOD 30 MIN: CPT

## 2025-05-27 RX ORDER — CLOBETASOL PROPIONATE 0.5 MG/G
CREAM TOPICAL BID
Qty: 30 | Refills: 2 | Status: ERX | COMMUNITY
Start: 2025-05-27

## 2025-05-27 RX ORDER — TACROLIMUS 1 MG/G
OINTMENT TOPICAL
Qty: 60 | Refills: 3 | Status: ERX | COMMUNITY
Start: 2025-05-27

## 2025-05-27 RX ADMIN — TACROLIMUS: 1 OINTMENT TOPICAL at 00:00

## 2025-05-27 RX ADMIN — CLOBETASOL PROPIONATE: 0.5 CREAM TOPICAL at 00:00

## 2025-05-27 ASSESSMENT — LOCATION DETAILED DESCRIPTION DERM
LOCATION DETAILED: LEFT DISTAL DORSAL MIDDLE FINGER
LOCATION DETAILED: RIGHT DISTAL DORSAL MIDDLE FINGER
LOCATION DETAILED: LEFT DISTAL DORSAL INDEX FINGER
LOCATION DETAILED: RIGHT DISTAL DORSAL INDEX FINGER
LOCATION DETAILED: RIGHT DISTAL DORSAL RING FINGER
LOCATION DETAILED: LEFT DISTAL DORSAL RING FINGER

## 2025-05-27 ASSESSMENT — LOCATION SIMPLE DESCRIPTION DERM
LOCATION SIMPLE: LEFT RING FINGER
LOCATION SIMPLE: LEFT INDEX FINGER
LOCATION SIMPLE: RIGHT INDEX FINGER
LOCATION SIMPLE: LEFT MIDDLE FINGER
LOCATION SIMPLE: RIGHT MIDDLE FINGER
LOCATION SIMPLE: RIGHT RING FINGER

## 2025-05-27 ASSESSMENT — LOCATION ZONE DERM: LOCATION ZONE: FINGER

## 2025-05-27 NOTE — PROCEDURE: PRESCRIPTION MEDICATION MANAGEMENT
Initiate Treatment: tacrolimus 0.1 % topical ointment QHS PRN
Render In Strict Bullet Format?: No
Continue Regimen: mupirocin 2 % ointment to open wounds and fissures 1-2x daily until resolved\\nClobetasol 0.05%. Switch to cream QAM for flares unresolved with Tacrolimous \\nWorking hands moisturizing cream QD
Discontinue Regimen: clobetasol 0.05 % ointment (finds texture difficult for mornings - will switch to clobetasol cream qAM and Protopic qHS)
Detail Level: Zone
Plan: Discussed that I continue to suspect irritant playing a major role, Crohns is flaring and leading to more frequent bathroom trips/handwashing

## 2025-05-29 ENCOUNTER — APPOINTMENT (OUTPATIENT)
Dept: CT IMAGING | Age: 60
End: 2025-05-29
Payer: COMMERCIAL

## 2025-05-29 ENCOUNTER — HOSPITAL ENCOUNTER (INPATIENT)
Age: 60
LOS: 2 days | Discharge: HOME | End: 2025-05-31
Attending: EMERGENCY MEDICINE | Admitting: FAMILY MEDICINE
Payer: COMMERCIAL

## 2025-05-29 ENCOUNTER — APPOINTMENT (OUTPATIENT)
Dept: GENERAL RADIOLOGY | Age: 60
End: 2025-05-29
Payer: COMMERCIAL

## 2025-05-29 DIAGNOSIS — J96.01 ACUTE RESPIRATORY FAILURE WITH HYPOXIA (HCC): ICD-10-CM

## 2025-05-29 DIAGNOSIS — R65.20 SEPSIS WITH ACUTE HYPOXIC RESPIRATORY FAILURE WITHOUT SEPTIC SHOCK, DUE TO UNSPECIFIED ORGANISM (HCC): Primary | ICD-10-CM

## 2025-05-29 DIAGNOSIS — J96.01 SEPSIS WITH ACUTE HYPOXIC RESPIRATORY FAILURE WITHOUT SEPTIC SHOCK, DUE TO UNSPECIFIED ORGANISM (HCC): Primary | ICD-10-CM

## 2025-05-29 DIAGNOSIS — J18.9 PNEUMONIA OF RIGHT LUNG DUE TO INFECTIOUS ORGANISM, UNSPECIFIED PART OF LUNG: ICD-10-CM

## 2025-05-29 DIAGNOSIS — A41.9 SEPSIS WITH ACUTE HYPOXIC RESPIRATORY FAILURE WITHOUT SEPTIC SHOCK, DUE TO UNSPECIFIED ORGANISM (HCC): Primary | ICD-10-CM

## 2025-05-29 DIAGNOSIS — K50.90 GASTROINTESTINAL CROHN'S DISEASE (HCC): ICD-10-CM

## 2025-05-29 PROBLEM — F32.A DEPRESSIVE DISORDER: Status: ACTIVE | Noted: 2021-09-13

## 2025-05-29 PROBLEM — J69.0 ASPIRATION PNEUMONIA (HCC): Status: ACTIVE | Noted: 2025-05-29

## 2025-05-29 PROBLEM — K21.9 GASTROESOPHAGEAL REFLUX DISEASE: Status: ACTIVE | Noted: 2021-09-13

## 2025-05-29 PROBLEM — E87.1 HYPONATREMIA: Status: ACTIVE | Noted: 2025-05-29

## 2025-05-29 PROBLEM — F10.90 ALCOHOL USE: Status: ACTIVE | Noted: 2025-05-29

## 2025-05-29 LAB
ALBUMIN SERPL-MCNC: 3.7 G/DL (ref 3.5–5)
ALBUMIN/GLOB SERPL: 0.9 (ref 1–1.9)
ALP SERPL-CCNC: 148 U/L (ref 35–104)
ALT SERPL-CCNC: 25 U/L (ref 8–45)
ANION GAP SERPL CALC-SCNC: 13 MMOL/L (ref 7–16)
ANION GAP SERPL CALC-SCNC: 15 MMOL/L (ref 7–16)
APPEARANCE UR: CLEAR
AST SERPL-CCNC: 22 U/L (ref 15–37)
BACTERIA URNS QL MICRO: ABNORMAL /HPF
BASOPHILS # BLD: 0.07 K/UL (ref 0–0.2)
BASOPHILS NFR BLD: 0.5 % (ref 0–2)
BILIRUB SERPL-MCNC: 0.4 MG/DL (ref 0–1.2)
BILIRUB UR QL: NEGATIVE
BUN SERPL-MCNC: 14 MG/DL (ref 6–23)
BUN SERPL-MCNC: 15 MG/DL (ref 6–23)
CALCIUM SERPL-MCNC: 8.1 MG/DL (ref 8.8–10.2)
CALCIUM SERPL-MCNC: 9.4 MG/DL (ref 8.8–10.2)
CHLORIDE SERPL-SCNC: 104 MMOL/L (ref 98–107)
CHLORIDE SERPL-SCNC: 98 MMOL/L (ref 98–107)
CO2 SERPL-SCNC: 19 MMOL/L (ref 20–29)
CO2 SERPL-SCNC: 21 MMOL/L (ref 20–29)
COLOR UR: ABNORMAL
CREAT SERPL-MCNC: 0.95 MG/DL (ref 0.6–1.1)
CREAT SERPL-MCNC: 0.95 MG/DL (ref 0.6–1.1)
CRP SERPL HS-MCNC: 34 MG/L (ref 0–3)
D DIMER PPP FEU-MCNC: 0.89 UG/ML(FEU)
DIFFERENTIAL METHOD BLD: ABNORMAL
EKG ATRIAL RATE: 124 BPM
EKG DIAGNOSIS: NORMAL
EKG P AXIS: 72 DEGREES
EKG P-R INTERVAL: 131 MS
EKG Q-T INTERVAL: 308 MS
EKG QRS DURATION: 89 MS
EKG QTC CALCULATION (BAZETT): 441 MS
EKG R AXIS: -30 DEGREES
EKG T AXIS: 95 DEGREES
EKG VENTRICULAR RATE: 123 BPM
EOSINOPHIL # BLD: 0.21 K/UL (ref 0–0.8)
EOSINOPHIL NFR BLD: 1.5 % (ref 0.5–7.8)
EPI CELLS #/AREA URNS HPF: ABNORMAL /HPF
ERYTHROCYTE [DISTWIDTH] IN BLOOD BY AUTOMATED COUNT: 13 % (ref 11.9–14.6)
ERYTHROCYTE [SEDIMENTATION RATE] IN BLOOD: 9 MM/HR (ref 0–30)
FLUAV RNA SPEC QL NAA+PROBE: NOT DETECTED
FLUBV RNA SPEC QL NAA+PROBE: NOT DETECTED
GLOBULIN SER CALC-MCNC: 4 G/DL (ref 2.3–3.5)
GLUCOSE SERPL-MCNC: 128 MG/DL (ref 70–99)
GLUCOSE SERPL-MCNC: 143 MG/DL (ref 70–99)
GLUCOSE UR STRIP.AUTO-MCNC: NEGATIVE MG/DL
HCT VFR BLD AUTO: 40.4 % (ref 35.8–46.3)
HGB BLD-MCNC: 13.7 G/DL (ref 11.7–15.4)
HGB UR QL STRIP: NEGATIVE
IMM GRANULOCYTES # BLD AUTO: 0.04 K/UL (ref 0–0.5)
IMM GRANULOCYTES NFR BLD AUTO: 0.3 % (ref 0–5)
KETONES UR QL STRIP.AUTO: ABNORMAL MG/DL
LACTATE SERPL-SCNC: 1.6 MMOL/L (ref 0.5–2)
LEUKOCYTE ESTERASE UR QL STRIP.AUTO: NEGATIVE
LIPASE SERPL-CCNC: 109 U/L (ref 13–60)
LYMPHOCYTES # BLD: 1.23 K/UL (ref 0.5–4.6)
LYMPHOCYTES NFR BLD: 9 % (ref 13–44)
MCH RBC QN AUTO: 32.3 PG (ref 26.1–32.9)
MCHC RBC AUTO-ENTMCNC: 33.9 G/DL (ref 31.4–35)
MCV RBC AUTO: 95.3 FL (ref 82–102)
MONOCYTES # BLD: 0.38 K/UL (ref 0.1–1.3)
MONOCYTES NFR BLD: 2.8 % (ref 4–12)
NEUTS SEG # BLD: 11.74 K/UL (ref 1.7–8.2)
NEUTS SEG NFR BLD: 85.9 % (ref 43–78)
NITRITE UR QL STRIP.AUTO: NEGATIVE
NRBC # BLD: 0 K/UL (ref 0–0.2)
OTHER OBSERVATIONS: ABNORMAL
PH UR STRIP: 6 (ref 5–9)
PLATELET # BLD AUTO: 383 K/UL (ref 150–450)
PMV BLD AUTO: 8.4 FL (ref 9.4–12.3)
POTASSIUM SERPL-SCNC: 3.1 MMOL/L (ref 3.5–5.1)
POTASSIUM SERPL-SCNC: 3.2 MMOL/L (ref 3.5–5.1)
PROCALCITONIN SERPL-MCNC: 0.26 NG/ML (ref 0–0.1)
PROT SERPL-MCNC: 7.7 G/DL (ref 6.3–8.2)
PROT UR STRIP-MCNC: 30 MG/DL
RBC # BLD AUTO: 4.24 M/UL (ref 4.05–5.2)
RBC #/AREA URNS HPF: ABNORMAL /HPF
SARS-COV-2 RDRP RESP QL NAA+PROBE: NOT DETECTED
SODIUM SERPL-SCNC: 134 MMOL/L (ref 136–145)
SODIUM SERPL-SCNC: 136 MMOL/L (ref 136–145)
SOURCE: NORMAL
SP GR UR REFRACTOMETRY: 1.03 (ref 1–1.02)
UROBILINOGEN UR QL STRIP.AUTO: 0.2 EU/DL (ref 0.2–1)
WBC # BLD AUTO: 13.7 K/UL (ref 4.3–11.1)
WBC URNS QL MICRO: ABNORMAL /HPF

## 2025-05-29 PROCEDURE — 71046 X-RAY EXAM CHEST 2 VIEWS: CPT

## 2025-05-29 PROCEDURE — 80053 COMPREHEN METABOLIC PANEL: CPT

## 2025-05-29 PROCEDURE — 84145 PROCALCITONIN (PCT): CPT

## 2025-05-29 PROCEDURE — 93005 ELECTROCARDIOGRAM TRACING: CPT | Performed by: EMERGENCY MEDICINE

## 2025-05-29 PROCEDURE — 6370000000 HC RX 637 (ALT 250 FOR IP): Performed by: EMERGENCY MEDICINE

## 2025-05-29 PROCEDURE — 83605 ASSAY OF LACTIC ACID: CPT

## 2025-05-29 PROCEDURE — 2500000003 HC RX 250 WO HCPCS: Performed by: FAMILY MEDICINE

## 2025-05-29 PROCEDURE — 6360000002 HC RX W HCPCS: Performed by: EMERGENCY MEDICINE

## 2025-05-29 PROCEDURE — 83690 ASSAY OF LIPASE: CPT

## 2025-05-29 PROCEDURE — 96375 TX/PRO/DX INJ NEW DRUG ADDON: CPT

## 2025-05-29 PROCEDURE — 85379 FIBRIN DEGRADATION QUANT: CPT

## 2025-05-29 PROCEDURE — 2580000003 HC RX 258: Performed by: FAMILY MEDICINE

## 2025-05-29 PROCEDURE — 2580000003 HC RX 258: Performed by: EMERGENCY MEDICINE

## 2025-05-29 PROCEDURE — 86141 C-REACTIVE PROTEIN HS: CPT

## 2025-05-29 PROCEDURE — 81001 URINALYSIS AUTO W/SCOPE: CPT

## 2025-05-29 PROCEDURE — 96361 HYDRATE IV INFUSION ADD-ON: CPT

## 2025-05-29 PROCEDURE — 87040 BLOOD CULTURE FOR BACTERIA: CPT

## 2025-05-29 PROCEDURE — 85652 RBC SED RATE AUTOMATED: CPT

## 2025-05-29 PROCEDURE — 2500000003 HC RX 250 WO HCPCS: Performed by: EMERGENCY MEDICINE

## 2025-05-29 PROCEDURE — 85025 COMPLETE CBC W/AUTO DIFF WBC: CPT

## 2025-05-29 PROCEDURE — 99285 EMERGENCY DEPT VISIT HI MDM: CPT

## 2025-05-29 PROCEDURE — 6360000002 HC RX W HCPCS: Performed by: FAMILY MEDICINE

## 2025-05-29 PROCEDURE — 71260 CT THORAX DX C+: CPT

## 2025-05-29 PROCEDURE — 6360000004 HC RX CONTRAST MEDICATION: Performed by: EMERGENCY MEDICINE

## 2025-05-29 PROCEDURE — 87507 IADNA-DNA/RNA PROBE TQ 12-25: CPT

## 2025-05-29 PROCEDURE — 87086 URINE CULTURE/COLONY COUNT: CPT

## 2025-05-29 PROCEDURE — 6370000000 HC RX 637 (ALT 250 FOR IP): Performed by: FAMILY MEDICINE

## 2025-05-29 PROCEDURE — 36415 COLL VENOUS BLD VENIPUNCTURE: CPT

## 2025-05-29 PROCEDURE — 87493 C DIFF AMPLIFIED PROBE: CPT

## 2025-05-29 PROCEDURE — 87636 SARSCOV2 & INF A&B AMP PRB: CPT

## 2025-05-29 PROCEDURE — 96365 THER/PROPH/DIAG IV INF INIT: CPT

## 2025-05-29 PROCEDURE — 83993 ASSAY FOR CALPROTECTIN FECAL: CPT

## 2025-05-29 PROCEDURE — 1100000003 HC PRIVATE W/ TELEMETRY

## 2025-05-29 PROCEDURE — 93010 ELECTROCARDIOGRAM REPORT: CPT | Performed by: INTERNAL MEDICINE

## 2025-05-29 RX ORDER — ACETAMINOPHEN 500 MG
1000 TABLET ORAL
Status: COMPLETED | OUTPATIENT
Start: 2025-05-29 | End: 2025-05-29

## 2025-05-29 RX ORDER — FOLIC ACID 1 MG/1
1 TABLET ORAL DAILY
Status: DISCONTINUED | OUTPATIENT
Start: 2025-05-29 | End: 2025-05-31 | Stop reason: HOSPADM

## 2025-05-29 RX ORDER — HYOSCYAMINE SULFATE 0.12 MG/1
TABLET ORAL
Status: ON HOLD | COMMUNITY
End: 2025-05-31

## 2025-05-29 RX ORDER — LAMOTRIGINE 100 MG/1
150 TABLET ORAL 2 TIMES DAILY
Status: DISCONTINUED | OUTPATIENT
Start: 2025-05-29 | End: 2025-05-31 | Stop reason: HOSPADM

## 2025-05-29 RX ORDER — DULOXETIN HYDROCHLORIDE 60 MG/1
60 CAPSULE, DELAYED RELEASE ORAL DAILY
COMMUNITY

## 2025-05-29 RX ORDER — 0.9 % SODIUM CHLORIDE 0.9 %
1000 INTRAVENOUS SOLUTION INTRAVENOUS
Status: COMPLETED | OUTPATIENT
Start: 2025-05-29 | End: 2025-05-29

## 2025-05-29 RX ORDER — IOPAMIDOL 755 MG/ML
100 INJECTION, SOLUTION INTRAVASCULAR
Status: COMPLETED | OUTPATIENT
Start: 2025-05-29 | End: 2025-05-30

## 2025-05-29 RX ORDER — LEVOTHYROXINE SODIUM 112 UG/1
112 TABLET ORAL DAILY
Status: DISCONTINUED | OUTPATIENT
Start: 2025-05-30 | End: 2025-05-29

## 2025-05-29 RX ORDER — ROPINIROLE 0.5 MG/1
TABLET, FILM COATED ORAL
COMMUNITY
Start: 2025-02-14

## 2025-05-29 RX ORDER — CHOLECALCIFEROL (VITAMIN D3) 1250 MCG
1 CAPSULE ORAL
Status: ON HOLD | COMMUNITY
End: 2025-05-29

## 2025-05-29 RX ORDER — ESCITALOPRAM OXALATE 10 MG/1
10 TABLET ORAL DAILY
Status: DISCONTINUED | OUTPATIENT
Start: 2025-05-29 | End: 2025-05-29

## 2025-05-29 RX ORDER — FERROUS GLUCONATE 324(38)MG
1 TABLET ORAL DAILY
COMMUNITY

## 2025-05-29 RX ORDER — ACETAMINOPHEN 650 MG/1
650 SUPPOSITORY RECTAL EVERY 6 HOURS PRN
Status: DISCONTINUED | OUTPATIENT
Start: 2025-05-29 | End: 2025-05-31 | Stop reason: HOSPADM

## 2025-05-29 RX ORDER — SODIUM CHLORIDE 9 MG/ML
INJECTION, SOLUTION INTRAVENOUS PRN
Status: DISCONTINUED | OUTPATIENT
Start: 2025-05-29 | End: 2025-05-31 | Stop reason: HOSPADM

## 2025-05-29 RX ORDER — SODIUM CHLORIDE 9 MG/ML
INJECTION, SOLUTION INTRAVENOUS PRN
Status: DISCONTINUED | OUTPATIENT
Start: 2025-05-29 | End: 2025-05-29 | Stop reason: SDUPTHER

## 2025-05-29 RX ORDER — BUSPIRONE HYDROCHLORIDE 10 MG/1
10 TABLET ORAL 4 TIMES DAILY
COMMUNITY

## 2025-05-29 RX ORDER — SODIUM CHLORIDE 0.9 % (FLUSH) 0.9 %
5-40 SYRINGE (ML) INJECTION PRN
Status: DISCONTINUED | OUTPATIENT
Start: 2025-05-29 | End: 2025-05-31 | Stop reason: HOSPADM

## 2025-05-29 RX ORDER — SODIUM CHLORIDE 0.9 % (FLUSH) 0.9 %
5-40 SYRINGE (ML) INJECTION EVERY 12 HOURS SCHEDULED
Status: DISCONTINUED | OUTPATIENT
Start: 2025-05-29 | End: 2025-05-31 | Stop reason: HOSPADM

## 2025-05-29 RX ORDER — LORAZEPAM 1 MG/1
4 TABLET ORAL
Status: DISCONTINUED | OUTPATIENT
Start: 2025-05-29 | End: 2025-05-31 | Stop reason: HOSPADM

## 2025-05-29 RX ORDER — FERROUS GLUCONATE 324(38)MG
324 TABLET ORAL 2 TIMES DAILY
Status: DISCONTINUED | OUTPATIENT
Start: 2025-05-29 | End: 2025-05-30

## 2025-05-29 RX ORDER — BUSPIRONE HYDROCHLORIDE 5 MG/1
10 TABLET ORAL 2 TIMES DAILY
Status: DISCONTINUED | OUTPATIENT
Start: 2025-05-29 | End: 2025-05-31 | Stop reason: HOSPADM

## 2025-05-29 RX ORDER — ESCITALOPRAM OXALATE 20 MG/1
40 TABLET ORAL DAILY
Status: ON HOLD | COMMUNITY
End: 2025-05-29

## 2025-05-29 RX ORDER — RISANKIZUMAB-RZAA 360 MG/2.4
WEARABLE INJECTOR SUBCUTANEOUS
COMMUNITY
Start: 2025-04-22

## 2025-05-29 RX ORDER — VALSARTAN 320 MG/1
320 TABLET ORAL DAILY
COMMUNITY
Start: 2025-02-05

## 2025-05-29 RX ORDER — PANTOPRAZOLE SODIUM 40 MG/1
40 TABLET, DELAYED RELEASE ORAL
Status: DISCONTINUED | OUTPATIENT
Start: 2025-05-30 | End: 2025-05-29

## 2025-05-29 RX ORDER — ONDANSETRON 2 MG/ML
4 INJECTION INTRAMUSCULAR; INTRAVENOUS ONCE
Status: COMPLETED | OUTPATIENT
Start: 2025-05-29 | End: 2025-05-29

## 2025-05-29 RX ORDER — LORAZEPAM 1 MG/1
1 TABLET ORAL
Status: DISCONTINUED | OUTPATIENT
Start: 2025-05-29 | End: 2025-05-31 | Stop reason: HOSPADM

## 2025-05-29 RX ORDER — LORAZEPAM 1 MG/1
2 TABLET ORAL
Status: DISCONTINUED | OUTPATIENT
Start: 2025-05-29 | End: 2025-05-31 | Stop reason: HOSPADM

## 2025-05-29 RX ORDER — KETOROLAC TROMETHAMINE 15 MG/ML
15 INJECTION, SOLUTION INTRAMUSCULAR; INTRAVENOUS EVERY 6 HOURS PRN
Status: DISCONTINUED | OUTPATIENT
Start: 2025-05-29 | End: 2025-05-31 | Stop reason: HOSPADM

## 2025-05-29 RX ORDER — METRONIDAZOLE 500 MG/100ML
500 INJECTION, SOLUTION INTRAVENOUS EVERY 8 HOURS
Status: DISCONTINUED | OUTPATIENT
Start: 2025-05-29 | End: 2025-05-31 | Stop reason: HOSPADM

## 2025-05-29 RX ORDER — IOPAMIDOL 755 MG/ML
80 INJECTION, SOLUTION INTRAVASCULAR
Status: COMPLETED | OUTPATIENT
Start: 2025-05-29 | End: 2025-05-29

## 2025-05-29 RX ORDER — CLONAZEPAM 0.5 MG/1
TABLET ORAL
COMMUNITY

## 2025-05-29 RX ORDER — ESCITALOPRAM OXALATE 10 MG/1
40 TABLET ORAL DAILY
Status: CANCELLED | OUTPATIENT
Start: 2025-05-30

## 2025-05-29 RX ORDER — OXYCODONE HYDROCHLORIDE 5 MG/1
5 TABLET ORAL EVERY 4 HOURS PRN
Refills: 0 | Status: DISCONTINUED | OUTPATIENT
Start: 2025-05-29 | End: 2025-05-30

## 2025-05-29 RX ORDER — 0.9 % SODIUM CHLORIDE 0.9 %
1625 INTRAVENOUS SOLUTION INTRAVENOUS
Status: COMPLETED | OUTPATIENT
Start: 2025-05-29 | End: 2025-05-29

## 2025-05-29 RX ORDER — MORPHINE SULFATE 4 MG/ML
4 INJECTION, SOLUTION INTRAMUSCULAR; INTRAVENOUS ONCE
Refills: 0 | Status: COMPLETED | OUTPATIENT
Start: 2025-05-29 | End: 2025-05-29

## 2025-05-29 RX ORDER — ROPINIROLE 0.25 MG/1
0.5 TABLET, FILM COATED ORAL NIGHTLY
Status: DISCONTINUED | OUTPATIENT
Start: 2025-05-29 | End: 2025-05-31 | Stop reason: HOSPADM

## 2025-05-29 RX ORDER — OXYCODONE HYDROCHLORIDE 5 MG/1
10 TABLET ORAL EVERY 6 HOURS PRN
Refills: 0 | Status: DISCONTINUED | OUTPATIENT
Start: 2025-05-29 | End: 2025-05-30

## 2025-05-29 RX ORDER — LEVOTHYROXINE SODIUM 125 UG/1
125 TABLET ORAL DAILY
Status: DISCONTINUED | OUTPATIENT
Start: 2025-05-30 | End: 2025-05-30

## 2025-05-29 RX ORDER — KETOROLAC TROMETHAMINE 15 MG/ML
15 INJECTION, SOLUTION INTRAMUSCULAR; INTRAVENOUS
Status: COMPLETED | OUTPATIENT
Start: 2025-05-29 | End: 2025-05-29

## 2025-05-29 RX ORDER — LACTOBACILLUS RHAMNOSUS GG 10B CELL
1 CAPSULE ORAL
Status: DISCONTINUED | OUTPATIENT
Start: 2025-05-30 | End: 2025-05-31 | Stop reason: HOSPADM

## 2025-05-29 RX ORDER — ACETAMINOPHEN 325 MG/1
650 TABLET ORAL EVERY 6 HOURS PRN
Status: DISCONTINUED | OUTPATIENT
Start: 2025-05-29 | End: 2025-05-31 | Stop reason: HOSPADM

## 2025-05-29 RX ORDER — BUPROPION HYDROCHLORIDE 300 MG/1
300 TABLET ORAL DAILY
Status: DISCONTINUED | OUTPATIENT
Start: 2025-05-30 | End: 2025-05-30

## 2025-05-29 RX ORDER — LANOLIN ALCOHOL/MO/W.PET/CERES
100 CREAM (GRAM) TOPICAL DAILY
Status: DISCONTINUED | OUTPATIENT
Start: 2025-05-29 | End: 2025-05-31 | Stop reason: HOSPADM

## 2025-05-29 RX ORDER — GUAIFENESIN/DEXTROMETHORPHAN 100-10MG/5
5 SYRUP ORAL EVERY 4 HOURS PRN
Status: DISCONTINUED | OUTPATIENT
Start: 2025-05-29 | End: 2025-05-31 | Stop reason: HOSPADM

## 2025-05-29 RX ORDER — SODIUM CHLORIDE, SODIUM LACTATE, POTASSIUM CHLORIDE, CALCIUM CHLORIDE 600; 310; 30; 20 MG/100ML; MG/100ML; MG/100ML; MG/100ML
INJECTION, SOLUTION INTRAVENOUS CONTINUOUS
Status: ACTIVE | OUTPATIENT
Start: 2025-05-29 | End: 2025-05-31

## 2025-05-29 RX ORDER — ENOXAPARIN SODIUM 100 MG/ML
40 INJECTION SUBCUTANEOUS EVERY 24 HOURS
Status: DISCONTINUED | OUTPATIENT
Start: 2025-05-29 | End: 2025-05-31 | Stop reason: HOSPADM

## 2025-05-29 RX ORDER — CLONAZEPAM 0.5 MG/1
0.5 TABLET ORAL EVERY 12 HOURS PRN
Status: DISCONTINUED | OUTPATIENT
Start: 2025-05-29 | End: 2025-05-31 | Stop reason: HOSPADM

## 2025-05-29 RX ORDER — DULOXETIN HYDROCHLORIDE 60 MG/1
60 CAPSULE, DELAYED RELEASE ORAL DAILY
Status: DISCONTINUED | OUTPATIENT
Start: 2025-05-30 | End: 2025-05-31 | Stop reason: HOSPADM

## 2025-05-29 RX ORDER — GUAIFENESIN 600 MG/1
600 TABLET, EXTENDED RELEASE ORAL 2 TIMES DAILY
Status: DISCONTINUED | OUTPATIENT
Start: 2025-05-29 | End: 2025-05-31 | Stop reason: HOSPADM

## 2025-05-29 RX ORDER — LORAZEPAM 1 MG/1
3 TABLET ORAL
Status: DISCONTINUED | OUTPATIENT
Start: 2025-05-29 | End: 2025-05-31 | Stop reason: HOSPADM

## 2025-05-29 RX ADMIN — SODIUM CHLORIDE, SODIUM LACTATE, POTASSIUM CHLORIDE, AND CALCIUM CHLORIDE: .6; .31; .03; .02 INJECTION, SOLUTION INTRAVENOUS at 16:40

## 2025-05-29 RX ADMIN — CEFTRIAXONE 1000 MG: 1 INJECTION, POWDER, FOR SOLUTION INTRAMUSCULAR; INTRAVENOUS at 07:27

## 2025-05-29 RX ADMIN — GUAIFENESIN 600 MG: 600 TABLET, MULTILAYER, EXTENDED RELEASE ORAL at 15:25

## 2025-05-29 RX ADMIN — LORAZEPAM 1 MG: 1 TABLET ORAL at 18:06

## 2025-05-29 RX ADMIN — GUAIFENESIN AND DEXTROMETHORPHAN 5 ML: 100; 10 SYRUP ORAL at 21:42

## 2025-05-29 RX ADMIN — SODIUM CHLORIDE 1625 ML: 0.9 INJECTION, SOLUTION INTRAVENOUS at 07:54

## 2025-05-29 RX ADMIN — ONDANSETRON 4 MG: 2 INJECTION, SOLUTION INTRAMUSCULAR; INTRAVENOUS at 10:41

## 2025-05-29 RX ADMIN — Medication 100 MG: at 16:03

## 2025-05-29 RX ADMIN — SODIUM CHLORIDE 1000 ML: 900 INJECTION, SOLUTION INTRAVENOUS at 07:27

## 2025-05-29 RX ADMIN — OXYCODONE 5 MG: 5 TABLET ORAL at 16:33

## 2025-05-29 RX ADMIN — MORPHINE SULFATE 4 MG: 4 INJECTION, SOLUTION INTRAMUSCULAR; INTRAVENOUS at 10:41

## 2025-05-29 RX ADMIN — OXYCODONE 5 MG: 5 TABLET ORAL at 20:59

## 2025-05-29 RX ADMIN — LAMOTRIGINE 150 MG: 100 TABLET ORAL at 21:00

## 2025-05-29 RX ADMIN — KETOROLAC TROMETHAMINE 15 MG: 15 INJECTION, SOLUTION INTRAMUSCULAR; INTRAVENOUS at 08:30

## 2025-05-29 RX ADMIN — CEFTRIAXONE SODIUM 2000 MG: 2 INJECTION, POWDER, FOR SOLUTION INTRAMUSCULAR; INTRAVENOUS at 15:24

## 2025-05-29 RX ADMIN — AZITHROMYCIN MONOHYDRATE 500 MG: 500 INJECTION, POWDER, LYOPHILIZED, FOR SOLUTION INTRAVENOUS at 07:29

## 2025-05-29 RX ADMIN — ACETAMINOPHEN 1000 MG: 500 TABLET, FILM COATED ORAL at 07:26

## 2025-05-29 RX ADMIN — FOLIC ACID 1 MG: 1 TABLET ORAL at 16:03

## 2025-05-29 RX ADMIN — IOPAMIDOL 80 ML: 755 INJECTION, SOLUTION INTRAVENOUS at 09:20

## 2025-05-29 RX ADMIN — GUAIFENESIN 600 MG: 600 TABLET, MULTILAYER, EXTENDED RELEASE ORAL at 21:00

## 2025-05-29 RX ADMIN — METRONIDAZOLE 500 MG: 500 INJECTION, SOLUTION INTRAVENOUS at 15:19

## 2025-05-29 RX ADMIN — SODIUM CHLORIDE, PRESERVATIVE FREE 10 ML: 5 INJECTION INTRAVENOUS at 21:02

## 2025-05-29 RX ADMIN — BUSPIRONE HYDROCHLORIDE 10 MG: 5 TABLET ORAL at 21:41

## 2025-05-29 RX ADMIN — METRONIDAZOLE 500 MG: 500 INJECTION, SOLUTION INTRAVENOUS at 23:03

## 2025-05-29 RX ADMIN — ROPINIROLE HYDROCHLORIDE 0.5 MG: 0.25 TABLET, FILM COATED ORAL at 21:41

## 2025-05-29 ASSESSMENT — PAIN DESCRIPTION - DESCRIPTORS
DESCRIPTORS: ACHING

## 2025-05-29 ASSESSMENT — PAIN DESCRIPTION - LOCATION
LOCATION: BACK;HEAD
LOCATION: BACK
LOCATION: ABDOMEN;BACK

## 2025-05-29 ASSESSMENT — PAIN SCALES - GENERAL
PAINLEVEL_OUTOF10: 6
PAINLEVEL_OUTOF10: 6
PAINLEVEL_OUTOF10: 2
PAINLEVEL_OUTOF10: 6
PAINLEVEL_OUTOF10: 4
PAINLEVEL_OUTOF10: 6

## 2025-05-29 ASSESSMENT — LIFESTYLE VARIABLES: HOW OFTEN DO YOU HAVE A DRINK CONTAINING ALCOHOL: 2-3 TIMES A WEEK

## 2025-05-29 NOTE — ED NOTES
TRANSFER - OUT REPORT:    Verbal report given to  Brandi RN on Nyasia Lamb  being transferred to 356 for routine progression of patient care       Report consisted of patient's Situation, Background, Assessment and   Recommendations(SBAR).     Information from the following report(s) ED SBAR was reviewed with the receiving nurse.    Lines:   Peripheral IV 05/29/25 Right Antecubital (Active)        Opportunity for questions and clarification was provided.      Patient transported with:  Tech

## 2025-05-29 NOTE — PROGRESS NOTES
PT/OT screen - spoke with patient.  She has been moving around fine and did not think she needed any PT/OT.  Will d/c.  She said she will tell the RN if something changes.

## 2025-05-29 NOTE — H&P
Hospitalist History and Physical   Admit Date:  2025  6:06 AM   Name:  Nyasia Lamb   Age:  59 y.o.  Sex:  female  :  1965   MRN:  [id number]   Room:  [facility]/    Presenting/Chief Complaint: Abdominal Pain (Crohn's flare up )     Reason(s) for Admission: No admission diagnoses are documented for this encounter.     History of Present Illness:   Nyasia Lamb is a 59 y.o. female with medical history of chronic hyponatremia, COPD, Crohn's disease who presented with fever, chills, SOB associated with N/V, mild abdominal discomfort associated 3 weeks of loose stools that have gotten progressively worse. She follows Dr. Fischer with [facility] for history of Crohn's. State she was recent seen by them and had a negative Cdiff study. They suspected Crohn's contributing and started her on Lomotil and ordered CTAP which has not been done. She has been on Skyrizi outpatient for Crohn's but didn't think it was working well. On , she woke up with dyspnea associated with fever,chills, cough, N/V and generalized weakness.    In ED, Tmax 101.7 with tachycardia and O2 sat was 89% on RA, pt was placed on 2LO2NC. WBC 13.7. Na 134, K 3.1. Procal 0.26. Rapid covid negative. UA not c/s infection. CT chest was negative for PE but shows mild R basilar opacity concerning for PNA. She was given Toradol, Tylenol, Rocephin/Azithromycin, Morphine, Zofrn and sepsis bolus in ED.      Assessment & Plan:     Sepsis  Aspiration Pneumonia  Acute respiratory with hypoxia  Met sepsis criteria with HR>100, Tmax 101 and WBC>13K with CTAP concerning for PNA. O2 sat was 89% on RA, pt was placed on 2LO2NC in ED  CT chest was negative for PE but shows mild R basilar opacity concerning for PNA.   Start Mucinex BID, antitussives prn  Add IS encourage use  BCX: pending  Abx: Rocephin/Flagyl (-...)   Add probiotics  Obtain sputum Cx if able  On 2LO2NC, wean as tolerated    Abdominal pain / Diarrhea  Crohn's Colitis  Hold home  Procalcitonin 0.26 (H) 0.00 - 0.10 ng/mL   COVID-19 & Influenza Combo    Collection Time: 05/29/25  6:23 AM    Specimen: Swab   Result Value Ref Range    Source NASAL      SARS-CoV-2, Rapid Not detected NOTD      Influenza A, DANIELLE Not detected NOTD      Influenza B, DANIELLE Not detected NOTD     Urinalysis    Collection Time: 05/29/25  7:32 AM   Result Value Ref Range    Color, UA YELLOW/STRAW      Appearance CLEAR      Specific Gravity, UA 1.026 (H) 1.001 - 1.023      pH, Urine 6.0 5.0 - 9.0      Protein, UA 30 (A) NEG mg/dL    Glucose, Ur Negative NEG mg/dL    Ketones, Urine TRACE (A) NEG mg/dL    Bilirubin, Urine Negative NEG      Blood, Urine Negative NEG      Urobilinogen, Urine 0.2 0.2 - 1.0 EU/dL    Nitrite, Urine Negative NEG      Leukocyte Esterase, Urine Negative NEG      WBC, UA 0-3 0 /hpf    RBC, UA 0-3 0 /hpf    Epithelial Cells, UA 5-10 0 /hpf    BACTERIA, URINE 2+ (H) 0 /hpf    Other observations RESULTS VERIFIED MANUALLY     D-Dimer, Quantitative    Collection Time: 05/29/25  7:45 AM   Result Value Ref Range    D-Dimer, Quant 0.89 (H) <0.56 ug/ml(FEU)       Recent Labs     05/29/25  0623   COVID19 Not detected       CT CHEST PULMONARY EMBOLISM W CONTRAST  Result Date: 5/29/2025  EXAMINATION: CT CHEST PULMONARY EMBOLISM W CONTRAST 5/29/2025 9:25 AM COMPARISON: None available INDICATION: Shortness of breath, hypoxia, elevated D-dimer TECHNIQUE: Multiple contiguous 2D axial CT images of the chest were obtained from the lung apices to the lung bases after the intravenous administration of 100mL Iso-tim 370 per pulmonary angiography protocol.  Coronal reconstructions were performed. Radiation dose reduction techniques were used for this study. Our CT scanners use one or all of the following: Automated exposure control, adjustment of the mA and/or kV according to patient size, iterative reconstruction. FINDINGS: STUDY QUALITY: The exam study quality is good. AIRWAYS: The central airways are patent. LUNGS: There  are patchy interstitial infiltrates throughout the right lung compatible multifocal pneumonia. The left lung is clear..  No suspicious pulmonary nodules. PLEURA: No pleural effusion or pneumothorax. HEART: The heart is not enlarged. Minimal coronary artery calcification..  No pericardial effusion. THORACIC AORTA: The aorta is normal in caliber. PULMONARY ARTERY: No pulmonary embolus to the segmental level. The main pulmonary artery is normal in caliber. MEDIASTINUM/ALEXEY: No mediastinal mass or lymphadenopathy. CHEST WALL: No mass or axillary lymphadenopathy. UPPER ABDOMEN: The visualized upper abdomen is unremarkable. BONES: No suspicious osseous lesion.     There is no CT evidence of pulmonary embolus or thoracic aortic aneurysm/dissection. Multifocal pneumonia throughout the right lung. Electronically signed by Reddy LOBO CHEST (2 VW)  Result Date: 5/29/2025  Chest X-ray INDICATION: suspected infection COMPARISON: CT of the chest from 1/17/2016. TECHNIQUE: PA and lateral views of the chest were obtained. FINDINGS: Mild right basilar opacity.  The heart size is normal.  The bony thorax is intact.      Mild right basilar opacity may represent developing pneumonia. Electronically signed by Guille Valdivia        Signed:  Clare Patel DO    Part of this note may have been written by using a voice dictation software.  The note has been proof read but may still contain some grammatical/other typographical errors.

## 2025-05-29 NOTE — ED PROVIDER NOTES
Emergency Department Provider Note       [facility] [place]   PCP: Amanda Jeong MD   Age: 59 y.o.   Sex: female     DISPOSITION Decision To Admit 05/29/2025 10:28:34 AM    ICD-10-CM    1. Sepsis with acute hypoxic respiratory failure without septic shock, due to unspecified organism (HCC)  A41.9     R65.20     J96.01       2. Acute respiratory failure with hypoxia (HCC)  J96.01       3. Pneumonia of right lung due to infectious organism, unspecified part of lung  J18.9           Medical Decision Making     59-year-old  female with history of COPD, Crohn's disease, depression, HTN, RLS, seizures, hypothyroidism and TIA presents to the emergency department with a 3-week history of loose stools, to the point where she has to wear briefs at work.  She has been seen by her family doctor as well as her gastroenterologist, and they actually thought that she was active in her Crohn's as well as may be suffering from some pancreatic insufficiency.  Things changed this morning when she awakened at about 4 AM, short of breath, running a fever with chills, and feeling very weak.  Patient does admit to some intermittent cough over the last week, but she would not call it severe and was taking some over-the-counter medication for the same.  She denies any lower extremity edema, PND or orthopnea.  She does sleep in a more upright position due to reflux.  Denies fevers before this morning.  Since arrival here with oxygen application, feeling much improved.  She did use her inhaler this morning with minimal improvement.  On exam, patient is hypoxic with a room air O2 sat of 89% prior to oxygen administration, chest is clear to auscultation, there is no lower extremity edema, white count is elevated at 13.7 with a left shift, D-dimer is elevated at 0.89, chest x-ray reveals right-sided pneumonia, CRP is elevated at 34 and sed rate is normal at 9 and COVID and flu combo testing is negative.  Procalcitonin is  mildly elevated at 0.26, and lactic acid is normal at 1.6.  Due to the elevated D-dimer, patient was screened with CT chest which revealed multifocal pneumonia but no evidence of PE.  Patient was given IV fluids 30 mL/kg and treated for pneumonia with Rocephin and Zithromax.  Will discuss with hospitalist.  ED Course as of 05/29/25 1028   Thu May 29, 2025   0703 XR CHEST (2 VW) [BB]      ED Course User Index  [BB] Pepe Hidalgo MD     1 or more acute illnesses that pose a threat to life or bodily function.   Chronic medical problems impacting care include Crohn's disease, COPD.  Shared medical decision making was utilized in creating the patients health plan today.  I independently ordered and reviewed each unique test.    I reviewed external records: provider visit note from PCP.  I reviewed external records: previous lab results from outside ED.       I interpreted the X-rays chest x-ray reveals right-sided pneumonia.  I interpreted the CT Scan CT chest PE protocol revealed no evidence of PE but did reveal right-sided pneumonia.    The patient was admitted and I have discussed patient management with the admitting provider.  Exclusion criteria - the patient is NOT to be included for SEP-1 Core Measure due to: May have criteria for sepsis, but does not meet criteria for severe sepsis or septic shock   Critical care procedure note : 55 minutes of critical care time was performed in the emergency department. This was separate from any other procedures listed during the patients emergency department course. The failure to initiate these interventions on an urgent basis would likely have resulted in sudden, clinically significant or life-threatening deterioration in the patients condition.       History     59-year-old  female with history of COPD, Crohn's disease, depression, HTN, RLS, seizures, hypothyroidism and TIA presents to the emergency department with a 3-week history of loose stools, to the point  (BENTYL) 20 MG TABLET    Take 20 mg by mouth every 6 hours    ERGOCALCIFEROL (ERGOCALCIFEROL) 1.25 MG (21849 UT) CAPSULE    Take 50,000 Units by mouth every 7 days    ESCITALOPRAM (LEXAPRO) 10 MG TABLET    Take 10 mg by mouth daily    ESTRADIOL (JOSEMANUEL) 0.05 MG/24HR    APPLY ONE PATCH TO THE SKIN TWICE A WEEK    ESTRADIOL (JOSEMANUEL) 0.1 MG/24HR    APPLY ONE PATCH TO THE SKIN TWICE A WEEK    KETOROLAC (TORADOL) 10 MG TABLET    Take 1 tablet by mouth every 6 hours as needed for Pain    LAMOTRIGINE (LAMICTAL) 200 MG TABLET    Take 200 mg by mouth 2 times daily    LEVOTHYROXINE (SYNTHROID) 112 MCG TABLET    TAKE ONE TABLET BY MOUTH ONE TIME DAILY BEFORE BREAKFAST    LURASIDONE (LATUDA) 60 MG TABS TABLET    Take 60 mg by mouth    MICONAZOLE (MICOTIN) 2 % VAGINAL CREAM    Place 1 applicator vaginally    ONDANSETRON (ZOFRAN-ODT) 4 MG DISINTEGRATING TABLET    Take 1 tablet by mouth 3 times daily as needed for Nausea or Vomiting    PANTOPRAZOLE (PROTONIX) 40 MG TABLET    TAKE ONE TABLET BY MOUTH ONE TIME DAILY    SUVOREXANT 15 MG TABS    Take by mouth.    TOLTERODINE (DETROL LA) 4 MG EXTENDED RELEASE CAPSULE    TAKE ONE CAPSULE BY MOUTH ONE TIME DAILY    VEDOLIZUMAB (ENTYVIO) 300 MG INJECTION    Infuse 300 mg intravenously once        Results from this emergency department visit:      Results for orders placed or performed during the hospital encounter of 05/29/25   COVID-19 & Influenza Combo    Specimen: Swab   Result Value Ref Range    Source NASAL      SARS-CoV-2, Rapid Not detected NOTD      Influenza A, DANIELLE Not detected NOTD      Influenza B, DANIELLE Not detected NOTD     XR CHEST (2 VW)    Narrative    Chest X-ray    INDICATION: suspected infection    COMPARISON: CT of the chest from 1/17/2016.    TECHNIQUE: PA and lateral views of the chest were obtained.    FINDINGS: Mild right basilar opacity.  The heart size is normal.  The bony  thorax is intact.        Impression    Mild right basilar opacity may represent developing

## 2025-05-29 NOTE — CARE COORDINATION
CASE MANAGEMENT ASSESSMENT NOTE    Patient is a 59 year old female with sepsis.      Patient assessment completed at bedside.  Patient presents to assessment alert and oriented, and answers questions appropriately.  She lives at home alone.  Does have family support.  At baseline, she is independent with transfers.  She is working and is an active .  Patient has [employer] insurance.  Is established with PCP, Dr. Amanda Jeong.    RNCM offered [facility] referral for ETOH use and patient is agreeable.  RNCM notified [facility] liaison, Beka.    At this time, anticipate patient to be discharged home.  PT/OT evals are not anticipated to be needed in this case.  Case management will continue to follow.  Please notify if there are any changes.     Attending Physician: Amanda, Thu, DO  Admit Problem: Acute respiratory failure with hypoxia (HCC) [J96.01]  Sepsis (HCC) [A41.9]  Pneumonia of right lung due to infectious organism, unspecified part of lung [J18.9]  Sepsis with acute hypoxic respiratory failure without septic shock, due to unspecified organism (HCC) [A41.9, R65.20, J96.01]  Date/Time of Admission: 5/29/2025  6:06 AM  Problem List:  Patient Active Problem List   Diagnosis    Urge incontinence of urine    Crohn's colitis (HCC)    Lung nodules    Tobacco use    Generalized anxiety disorder    Hypothyroidism    RLS (restless legs syndrome)    HTN (hypertension)    Bipolar depression (HCC)    Abnormal chest CT    Diarrhea    Sepsis (HCC)    Aspiration pneumonia (HCC)    Acute respiratory failure with hypoxia (HCC)    Depressive disorder    Gastroesophageal reflux disease    Gastrointestinal Crohn's disease (HCC)    Alcohol use    Hyponatremia          05/29/25 1520   Service Assessment   Patient Orientation Alert and Oriented   Cognition Alert   History Provided By Patient   Primary Caregiver Self   Accompanied By/Relationship N/A   Support Systems Family Members   Patient's Healthcare Decision Maker is: Legal Next of

## 2025-05-29 NOTE — PROGRESS NOTES
Patient had a ct chest with contrast this morning. Spoke with the RN about waiting 24 hours post contrast before giving more contrast since order for CT A/P with contrast was just ordered.

## 2025-05-30 ENCOUNTER — APPOINTMENT (OUTPATIENT)
Dept: CT IMAGING | Age: 60
End: 2025-05-30
Payer: COMMERCIAL

## 2025-05-30 PROBLEM — E87.6 HYPOKALEMIA: Status: ACTIVE | Noted: 2025-05-30

## 2025-05-30 LAB
ANION GAP SERPL CALC-SCNC: 13 MMOL/L (ref 7–16)
BASOPHILS # BLD: 0.06 K/UL (ref 0–0.2)
BASOPHILS NFR BLD: 0.5 % (ref 0–2)
BUN SERPL-MCNC: 10 MG/DL (ref 6–23)
C. DIFFICILE TOXIN MOLECULAR: NEGATIVE
CALCIUM SERPL-MCNC: 8.4 MG/DL (ref 8.8–10.2)
CHLORIDE SERPL-SCNC: 105 MMOL/L (ref 98–107)
CO2 SERPL-SCNC: 21 MMOL/L (ref 20–29)
CREAT SERPL-MCNC: 0.69 MG/DL (ref 0.6–1.1)
DIFFERENTIAL METHOD BLD: ABNORMAL
EOSINOPHIL # BLD: 0.29 K/UL (ref 0–0.8)
EOSINOPHIL NFR BLD: 2.5 % (ref 0.5–7.8)
ERYTHROCYTE [DISTWIDTH] IN BLOOD BY AUTOMATED COUNT: 13.2 % (ref 11.9–14.6)
GASTROINTESTINAL PATHOGEN PANEL: NORMAL
GLUCOSE SERPL-MCNC: 119 MG/DL (ref 70–99)
HCT VFR BLD AUTO: 35.3 % (ref 35.8–46.3)
HGB BLD-MCNC: 11.9 G/DL (ref 11.7–15.4)
IMM GRANULOCYTES # BLD AUTO: 0.05 K/UL (ref 0–0.5)
IMM GRANULOCYTES NFR BLD AUTO: 0.4 % (ref 0–5)
LYMPHOCYTES # BLD: 1.09 K/UL (ref 0.5–4.6)
LYMPHOCYTES NFR BLD: 9.6 % (ref 13–44)
MAGNESIUM SERPL-MCNC: 1.7 MG/DL (ref 1.8–2.4)
MCH RBC QN AUTO: 32.8 PG (ref 26.1–32.9)
MCHC RBC AUTO-ENTMCNC: 33.7 G/DL (ref 31.4–35)
MCV RBC AUTO: 97.2 FL (ref 82–102)
MONOCYTES # BLD: 0.46 K/UL (ref 0.1–1.3)
MONOCYTES NFR BLD: 4 % (ref 4–12)
NEUTS SEG # BLD: 9.44 K/UL (ref 1.7–8.2)
NEUTS SEG NFR BLD: 83 % (ref 43–78)
NRBC # BLD: 0 K/UL (ref 0–0.2)
PLATELET # BLD AUTO: 288 K/UL (ref 150–450)
PMV BLD AUTO: 8.2 FL (ref 9.4–12.3)
POTASSIUM SERPL-SCNC: 3.1 MMOL/L (ref 3.5–5.1)
RBC # BLD AUTO: 3.63 M/UL (ref 4.05–5.2)
SODIUM SERPL-SCNC: 139 MMOL/L (ref 136–145)
WBC # BLD AUTO: 11.4 K/UL (ref 4.3–11.1)

## 2025-05-30 PROCEDURE — 36415 COLL VENOUS BLD VENIPUNCTURE: CPT

## 2025-05-30 PROCEDURE — 1100000003 HC PRIVATE W/ TELEMETRY

## 2025-05-30 PROCEDURE — 6360000004 HC RX CONTRAST MEDICATION: Performed by: FAMILY MEDICINE

## 2025-05-30 PROCEDURE — 99222 1ST HOSP IP/OBS MODERATE 55: CPT

## 2025-05-30 PROCEDURE — 83735 ASSAY OF MAGNESIUM: CPT

## 2025-05-30 PROCEDURE — 6370000000 HC RX 637 (ALT 250 FOR IP): Performed by: FAMILY MEDICINE

## 2025-05-30 PROCEDURE — 80048 BASIC METABOLIC PNL TOTAL CA: CPT

## 2025-05-30 PROCEDURE — 85025 COMPLETE CBC W/AUTO DIFF WBC: CPT

## 2025-05-30 PROCEDURE — 6370000000 HC RX 637 (ALT 250 FOR IP)

## 2025-05-30 PROCEDURE — 74177 CT ABD & PELVIS W/CONTRAST: CPT

## 2025-05-30 PROCEDURE — 6360000002 HC RX W HCPCS: Performed by: FAMILY MEDICINE

## 2025-05-30 PROCEDURE — 2580000003 HC RX 258: Performed by: FAMILY MEDICINE

## 2025-05-30 RX ORDER — BUPROPION HYDROCHLORIDE 150 MG/1
150 TABLET, EXTENDED RELEASE ORAL DAILY
COMMUNITY

## 2025-05-30 RX ORDER — MAGNESIUM SULFATE IN WATER 40 MG/ML
2000 INJECTION, SOLUTION INTRAVENOUS PRN
Status: DISCONTINUED | OUTPATIENT
Start: 2025-05-30 | End: 2025-05-31 | Stop reason: HOSPADM

## 2025-05-30 RX ORDER — POTASSIUM CHLORIDE 1500 MG/1
40 TABLET, EXTENDED RELEASE ORAL PRN
Status: DISCONTINUED | OUTPATIENT
Start: 2025-05-30 | End: 2025-05-31 | Stop reason: HOSPADM

## 2025-05-30 RX ORDER — LEVOTHYROXINE SODIUM 112 UG/1
112 TABLET ORAL DAILY
Status: DISCONTINUED | OUTPATIENT
Start: 2025-05-31 | End: 2025-05-31 | Stop reason: HOSPADM

## 2025-05-30 RX ORDER — DIPHENOXYLATE HYDROCHLORIDE AND ATROPINE SULFATE 2.5; .025 MG/1; MG/1
2 TABLET ORAL 4 TIMES DAILY
Status: DISCONTINUED | OUTPATIENT
Start: 2025-05-30 | End: 2025-05-31 | Stop reason: HOSPADM

## 2025-05-30 RX ORDER — FERROUS GLUCONATE 324(38)MG
324 TABLET ORAL
Status: DISCONTINUED | OUTPATIENT
Start: 2025-05-30 | End: 2025-05-31 | Stop reason: HOSPADM

## 2025-05-30 RX ORDER — LOPERAMIDE HYDROCHLORIDE 2 MG/1
4 CAPSULE ORAL ONCE
Status: DISCONTINUED | OUTPATIENT
Start: 2025-05-30 | End: 2025-05-30

## 2025-05-30 RX ORDER — BUPROPION HYDROCHLORIDE 150 MG/1
150 TABLET ORAL DAILY
Status: DISCONTINUED | OUTPATIENT
Start: 2025-05-30 | End: 2025-05-31 | Stop reason: HOSPADM

## 2025-05-30 RX ORDER — POTASSIUM CHLORIDE 7.45 MG/ML
10 INJECTION INTRAVENOUS PRN
Status: DISCONTINUED | OUTPATIENT
Start: 2025-05-30 | End: 2025-05-31 | Stop reason: HOSPADM

## 2025-05-30 RX ORDER — OXYCODONE HYDROCHLORIDE 5 MG/1
10 TABLET ORAL EVERY 6 HOURS PRN
Refills: 0 | Status: DISCONTINUED | OUTPATIENT
Start: 2025-05-30 | End: 2025-05-31 | Stop reason: HOSPADM

## 2025-05-30 RX ORDER — MAGNESIUM SULFATE IN WATER 40 MG/ML
2000 INJECTION, SOLUTION INTRAVENOUS ONCE
Status: COMPLETED | OUTPATIENT
Start: 2025-05-30 | End: 2025-05-30

## 2025-05-30 RX ORDER — DIATRIZOATE MEGLUMINE AND DIATRIZOATE SODIUM 660; 100 MG/ML; MG/ML
15 SOLUTION ORAL; RECTAL
Status: DISCONTINUED | OUTPATIENT
Start: 2025-05-30 | End: 2025-05-31 | Stop reason: HOSPADM

## 2025-05-30 RX ORDER — OXYCODONE HYDROCHLORIDE 5 MG/1
5 TABLET ORAL EVERY 4 HOURS PRN
Refills: 0 | Status: DISCONTINUED | OUTPATIENT
Start: 2025-05-30 | End: 2025-05-31 | Stop reason: HOSPADM

## 2025-05-30 RX ADMIN — DIPHENOXYLATE HYDROCHLORIDE AND ATROPINE SULFATE 2 TABLET: 2.5; .025 TABLET ORAL at 21:02

## 2025-05-30 RX ADMIN — POTASSIUM CHLORIDE 40 MEQ: 1500 TABLET, EXTENDED RELEASE ORAL at 11:36

## 2025-05-30 RX ADMIN — LAMOTRIGINE 150 MG: 100 TABLET ORAL at 21:02

## 2025-05-30 RX ADMIN — DIPHENOXYLATE HYDROCHLORIDE AND ATROPINE SULFATE 2 TABLET: 2.5; .025 TABLET ORAL at 16:07

## 2025-05-30 RX ADMIN — LAMOTRIGINE 150 MG: 100 TABLET ORAL at 08:39

## 2025-05-30 RX ADMIN — MAGNESIUM SULFATE HEPTAHYDRATE 2000 MG: 40 INJECTION, SOLUTION INTRAVENOUS at 11:42

## 2025-05-30 RX ADMIN — PANCRELIPASE LIPASE, PANCRELIPASE PROTEASE, PANCRELIPASE AMYLASE 10000 UNITS: 5000; 17000; 24000 CAPSULE, DELAYED RELEASE ORAL at 17:26

## 2025-05-30 RX ADMIN — CLONAZEPAM 0.5 MG: 0.5 TABLET ORAL at 08:45

## 2025-05-30 RX ADMIN — OXYCODONE 5 MG: 5 TABLET ORAL at 16:07

## 2025-05-30 RX ADMIN — METRONIDAZOLE 500 MG: 500 INJECTION, SOLUTION INTRAVENOUS at 15:00

## 2025-05-30 RX ADMIN — LORAZEPAM 1 MG: 1 TABLET ORAL at 15:12

## 2025-05-30 RX ADMIN — POTASSIUM CHLORIDE 40 MEQ: 1500 TABLET, EXTENDED RELEASE ORAL at 11:39

## 2025-05-30 RX ADMIN — BUPROPION HYDROCHLORIDE 150 MG: 150 TABLET, EXTENDED RELEASE ORAL at 08:38

## 2025-05-30 RX ADMIN — BUSPIRONE HYDROCHLORIDE 10 MG: 5 TABLET ORAL at 21:02

## 2025-05-30 RX ADMIN — Medication 100 MG: at 08:39

## 2025-05-30 RX ADMIN — DULOXETINE HYDROCHLORIDE 60 MG: 60 CAPSULE, DELAYED RELEASE ORAL at 08:40

## 2025-05-30 RX ADMIN — IOPAMIDOL 100 ML: 755 INJECTION, SOLUTION INTRAVENOUS at 10:29

## 2025-05-30 RX ADMIN — LEVOTHYROXINE SODIUM 125 MCG: 0.12 TABLET ORAL at 06:02

## 2025-05-30 RX ADMIN — DIATRIZOATE MEGLUMINE AND DIATRIZOATE SODIUM 15 ML: 660; 100 LIQUID ORAL; RECTAL at 08:46

## 2025-05-30 RX ADMIN — GUAIFENESIN 600 MG: 600 TABLET, MULTILAYER, EXTENDED RELEASE ORAL at 21:02

## 2025-05-30 RX ADMIN — BUSPIRONE HYDROCHLORIDE 10 MG: 5 TABLET ORAL at 08:39

## 2025-05-30 RX ADMIN — KETOROLAC TROMETHAMINE 15 MG: 15 INJECTION, SOLUTION INTRAMUSCULAR; INTRAVENOUS at 14:53

## 2025-05-30 RX ADMIN — OXYCODONE 5 MG: 5 TABLET ORAL at 11:31

## 2025-05-30 RX ADMIN — OXYCODONE 5 MG: 5 TABLET ORAL at 06:41

## 2025-05-30 RX ADMIN — GUAIFENESIN 600 MG: 600 TABLET, MULTILAYER, EXTENDED RELEASE ORAL at 08:39

## 2025-05-30 RX ADMIN — POTASSIUM BICARBONATE 40 MEQ: 782 TABLET, EFFERVESCENT ORAL at 21:02

## 2025-05-30 RX ADMIN — CEFTRIAXONE SODIUM 2000 MG: 2 INJECTION, POWDER, FOR SOLUTION INTRAMUSCULAR; INTRAVENOUS at 14:59

## 2025-05-30 RX ADMIN — Medication 1 CAPSULE: at 08:38

## 2025-05-30 RX ADMIN — ROPINIROLE HYDROCHLORIDE 0.5 MG: 0.25 TABLET, FILM COATED ORAL at 21:02

## 2025-05-30 RX ADMIN — FOLIC ACID 1 MG: 1 TABLET ORAL at 08:45

## 2025-05-30 RX ADMIN — METRONIDAZOLE 500 MG: 500 INJECTION, SOLUTION INTRAVENOUS at 06:05

## 2025-05-30 RX ADMIN — METRONIDAZOLE 500 MG: 500 INJECTION, SOLUTION INTRAVENOUS at 21:06

## 2025-05-30 RX ADMIN — FERROUS GLUCONATE 324 MG: 324 TABLET ORAL at 08:40

## 2025-05-30 ASSESSMENT — PAIN DESCRIPTION - LOCATION
LOCATION: ABDOMEN
LOCATION: ABDOMEN
LOCATION: BACK
LOCATION: BACK

## 2025-05-30 ASSESSMENT — PAIN SCALES - GENERAL
PAINLEVEL_OUTOF10: 0
PAINLEVEL_OUTOF10: 7
PAINLEVEL_OUTOF10: 6

## 2025-05-30 ASSESSMENT — PAIN DESCRIPTION - ORIENTATION
ORIENTATION: MID
ORIENTATION: MID
ORIENTATION: LEFT

## 2025-05-30 ASSESSMENT — PAIN DESCRIPTION - DESCRIPTORS
DESCRIPTORS: ACHING
DESCRIPTORS: DISCOMFORT
DESCRIPTORS: CRAMPING

## 2025-05-30 ASSESSMENT — ENCOUNTER SYMPTOMS
ABDOMINAL PAIN: 1
VOMITING: 0
BLOOD IN STOOL: 0
DIARRHEA: 1
NAUSEA: 0

## 2025-05-30 NOTE — PLAN OF CARE
Problem: Discharge Planning  Goal: Discharge to home or other facility with appropriate resources  Outcome: Progressing     Problem: Safety - Adult  Goal: Free from fall injury  Outcome: Progressing     Problem: ABCDS Injury Assessment  Goal: Absence of physical injury  Outcome: Progressing     Problem: Pain  Goal: Verbalizes/displays adequate comfort level or baseline comfort level  Outcome: Progressing      Quick follow up from low INR on 4/25 and close monitoring for watchman implant on 3/5. INR in therapeutic range. Patient reports no bleeding, bruising or other changes. Will maintain current weekly dose until follow up in 1 week. Advised patient to call with any changes or concerns.

## 2025-05-30 NOTE — CONSULTS
Consult Note            Date:5/30/2025        Patient Name:Nyasia aLmb     YOB: 1965     Age:59 y.o.    Inpatient consult to GI  Consult performed by: Jodi Briscoe APRN - CNP  Consult ordered by: Clare Patel DO          Chief Complaint     Chief Complaint   Patient presents with    Abdominal Pain     Crohn's flare up        History Obtained From   patient    History of Present Illness   Nyasia Lamb is a 60 yo female with a PMH including hyponatremia, COPD, Crohn's, C. Diff and drug induced lupus who presents to the ED with fever, chills, SOB, nausea, vomiting and diarrhea. Patient states for the last 3 weeks she has had severe watery foul smelling liquid diarrhea and she was going 20+ times a day. She states this is not like her typical Crohn's flare. She denies any bloody stools. She denies any nausea or vomiting but she is having sharp abdominal pain. She denies any recent travel or sick contacts. She states her next dose of Skyrizi is next Saturday 6/7 and she has been on it for a year now. She feels like it was doing well at first but it has slowly stopped working. When she began having diarrhea she went [facility] who ran a bunch of labs. Those labs showed an elevated C-reactive at 15, an elevated calprotectin at 131 and severely deficient pancreatitic elastase at 34. She does drink 2 wine spritzers a day but denies smoking and her last colonoscopy was in 2020 and she states she typically gets her colonoscopy every 3 years. C. Diff and GI panel was negative. CT AP showed fluid in stomach and mildly dilated small bowel consistant with gastroenteritis but no obstruction or abscess. She was given lomotil but this hasn't helped.     Labs: C. Diff negative, GI panel negative, Mg 1.7, WBC 11.4 (13.7), K+ 3.1, CRP 34, ESR 9, Calprotectin pending, Pancreatic elastase pending    Medications: Skyrizi, Lomotil, Lovenox, Imodium    Imaging: CT ABDOMEN PELVIS W IV CONTRAST  (ATIVAN) tablet 4 mg, Q1H PRN   Or  LORazepam (ATIVAN) 4 mg in sodium chloride (PF) 0.9 % 10 mL injection, Q1H PRN  cefTRIAXone (ROCEPHIN) 2,000 mg in sodium chloride 0.9 % 50 mL IVPB (addEASE), Q24H  metroNIDAZOLE (FLAGYL) 500 mg in 0.9% NaCl 100 mL IVPB premix, Q8H  ketorolac (TORADOL) injection 15 mg, Q6H PRN        Allergies   Statins and Levofloxacin    Social History     Social History       Tobacco History       Smoking Status  Former Quit Date  12/2/2017 Smoking Tobacco Type  Cigarettes quit in 12/2/2017   Pack Year History     Packs/Day From To Years    0 12/2/2017  7.5    1   0.0      Smokeless Tobacco Use  Never              Alcohol History       Alcohol Use Status  Yes Amount  0.0 standard drinks of alcohol/wk              Drug Use       Drug Use Status  No              Sexual Activity       Sexually Active  Not Asked                    Family History     Family History   Problem Relation Age of Onset    Heart Disease Mother     Cancer Father         bladder cancer 2010    Heart Disease Brother     Breast Cancer Neg Hx     Thyroid Disease Brother     Other Other         Mother ? questionable emphysema, smoker       Review of Systems   Review of Systems   Gastrointestinal:  Positive for abdominal pain and diarrhea. Negative for blood in stool, nausea and vomiting.        Physical Exam   BP (!) 145/99   Pulse 91   Temp 98.4 °F (36.9 °C) (Oral)   Resp 16   Ht 1.727 m (5' 8\")   Wt 87.5 kg (193 lb)   SpO2 94%   BMI 29.35 kg/m²      Physical Exam  Vitals and nursing note reviewed.   Constitutional:       Appearance: Normal appearance.   HENT:      Head: Normocephalic and atraumatic.   Eyes:      Conjunctiva/sclera: Conjunctivae normal.   Abdominal:      General: There is no distension.      Palpations: Abdomen is soft.      Tenderness: There is no abdominal tenderness.   Neurological:      Mental Status: She is alert and oriented to person, place, and time.         Labs    CBC:  Recent Labs

## 2025-05-30 NOTE — CARE COORDINATION
Chart reviewed by RNCM and discussed in IDR     CM following for continued stay.     Possible DC 1-2 days.    Referred to [facility].    Patient asked RNCM for services regarding assistance at home and setting up her medications.    May benefit from  SN for follow up.  RNCM provided patient with home health agency choice list with quality metrics.      Please consult case management if any additional discharge needs arise.

## 2025-05-30 NOTE — PROGRESS NOTES
Hospitalist Progress Note   Admit Date:  2025  6:06 AM   Name:  Nyasia Lamb   Age:  59 y.o.  Sex:  female  :  1965   MRN:  [id number]   Room:  [facility]    Presenting/Chief Complaint: Abdominal Pain (Crohn's flare up )     Reason(s) for Admission: Acute respiratory failure with hypoxia (HCC) [J96.01]  Sepsis (HCC) [A41.9]  Pneumonia of right lung due to infectious organism, unspecified part of lung [J18.9]  Sepsis with acute hypoxic respiratory failure without septic shock, due to unspecified organism (HCC) [A41.9, R65.20, J96.01]     Hospital Course:   Nyasia Lamb is a 59 y.o. female with medical history of chronic hyponatremia, COPD, Crohn's disease who presented with fever, chills, SOB associated with N/V, mild abdominal discomfort associated 3 weeks of loose stools that have gotten progressively worse. She follows Dr. [facility] with [facility] for history of Crohn's. State she was recent seen by them and had a negative Cdiff study. They suspected Crohn's contributing and started her on Lomotil and ordered CTAP which has not been done. She has been on Skyrizi outpatient for Crohn's but didn't think it was working well. On , she woke up with dyspnea associated with fever,chills, cough, N/V and generalized weakness.     In ED, Tmax 101.7 with tachycardia and O2 sat was 89% on RA, pt was placed on 2LO2NC. WBC 13.7. Na 134, K 3.1. Procal 0.26. Rapid covid negative. UA not c/s infection. CT chest was negative for PE but shows mild R basilar opacity concerning for PNA. She was given Toradol, Tylenol, Rocephin/Azithromycin, Morphine, Zofrn and sepsis bolus in ED.    Patient was admitted with sepsis secondary to aspiration pneumonia associated with acute hypoxia.  She was started on broad-spectrum IV antibiotics.  She was able to be weaned down to room air.  CTAP ordered.  Stool molecular GI panel ordered.  C. difficile panel ordered.    Subjective & 24hr Events:     Pt alert and oriented x3.  0.9 % injection 5-40 mL  5-40 mL IntraVENous PRN    enoxaparin (LOVENOX) injection 40 mg  40 mg SubCUTAneous Q24H    acetaminophen (TYLENOL) tablet 650 mg  650 mg Oral Q6H PRN    Or    acetaminophen (TYLENOL) suppository 650 mg  650 mg Rectal Q6H PRN    lactated ringers infusion   IntraVENous Continuous    lactobacillus (CULTURELLE) capsule 1 capsule  1 capsule Oral Daily with breakfast    guaiFENesin (MUCINEX) extended release tablet 600 mg  600 mg Oral BID    guaiFENesin-dextromethorphan (ROBITUSSIN DM) 100-10 MG/5ML syrup 5 mL  5 mL Oral Q4H PRN    iopamidol (ISOVUE-370) 76 % injection 100 mL  100 mL IntraVENous ONCE PRN    busPIRone (BUSPAR) tablet 10 mg  10 mg Oral BID    clonazePAM (KLONOPIN) tablet 0.5 mg  0.5 mg Oral Q12H PRN    DULoxetine (CYMBALTA) extended release capsule 60 mg  60 mg Oral Daily    rOPINIRole (REQUIP) tablet 0.5 mg  0.5 mg Oral Nightly    thiamine tablet 100 mg  100 mg Oral Daily    folic acid (FOLVITE) tablet 1 mg  1 mg Oral Daily    sodium chloride flush 0.9 % injection 5-40 mL  5-40 mL IntraVENous 2 times per day    sodium chloride flush 0.9 % injection 5-40 mL  5-40 mL IntraVENous PRN    0.9 % sodium chloride infusion   IntraVENous PRN    LORazepam (ATIVAN) tablet 1 mg  1 mg Oral Q1H PRN    Or    LORazepam (ATIVAN) 1 mg in sodium chloride (PF) 0.9 % 10 mL injection  1 mg IntraVENous Q1H PRN    Or    LORazepam (ATIVAN) tablet 2 mg  2 mg Oral Q1H PRN    Or    LORazepam (ATIVAN) 2 mg in sodium chloride (PF) 0.9 % 10 mL injection  2 mg IntraVENous Q1H PRN    Or    LORazepam (ATIVAN) tablet 3 mg  3 mg Oral Q1H PRN    Or    LORazepam (ATIVAN) 3 mg in sodium chloride (PF) 0.9 % 10 mL injection  3 mg IntraVENous Q1H PRN    Or    LORazepam (ATIVAN) tablet 4 mg  4 mg Oral Q1H PRN    Or    LORazepam (ATIVAN) 4 mg in sodium chloride (PF) 0.9 % 10 mL injection  4 mg IntraVENous Q1H PRN    cefTRIAXone (ROCEPHIN) 2,000 mg in sodium chloride 0.9 % 50 mL IVPB (addEASE)  2,000 mg IntraVENous Q24H     metroNIDAZOLE (FLAGYL) 500 mg in 0.9% NaCl 100 mL IVPB premix  500 mg IntraVENous Q8H    ketorolac (TORADOL) injection 15 mg  15 mg IntraVENous Q6H PRN       Signed:  Clare Patel DO    Part of this note may have been written by using a voice dictation software.  The note has been proof read but may still contain some grammatical/other typographical errors.

## 2025-05-31 VITALS
WEIGHT: 192.7 LBS | BODY MASS INDEX: 29.21 KG/M2 | SYSTOLIC BLOOD PRESSURE: 139 MMHG | OXYGEN SATURATION: 96 % | RESPIRATION RATE: 17 BRPM | DIASTOLIC BLOOD PRESSURE: 70 MMHG | HEART RATE: 90 BPM | TEMPERATURE: 98 F | HEIGHT: 68 IN

## 2025-05-31 LAB
ANION GAP SERPL CALC-SCNC: 12 MMOL/L (ref 7–16)
BACTERIA SPEC CULT: NORMAL
BASOPHILS # BLD: 0.07 K/UL (ref 0–0.2)
BASOPHILS NFR BLD: 0.7 % (ref 0–2)
BUN SERPL-MCNC: 5 MG/DL (ref 6–23)
CALCIUM SERPL-MCNC: 8.3 MG/DL (ref 8.8–10.2)
CHLORIDE SERPL-SCNC: 105 MMOL/L (ref 98–107)
CO2 SERPL-SCNC: 24 MMOL/L (ref 20–29)
CREAT SERPL-MCNC: 0.63 MG/DL (ref 0.6–1.1)
DIFFERENTIAL METHOD BLD: ABNORMAL
EOSINOPHIL # BLD: 0.56 K/UL (ref 0–0.8)
EOSINOPHIL NFR BLD: 5.6 % (ref 0.5–7.8)
ERYTHROCYTE [DISTWIDTH] IN BLOOD BY AUTOMATED COUNT: 13.3 % (ref 11.9–14.6)
GLUCOSE SERPL-MCNC: 125 MG/DL (ref 70–99)
HCT VFR BLD AUTO: 36.7 % (ref 35.8–46.3)
HGB BLD-MCNC: 12.3 G/DL (ref 11.7–15.4)
IMM GRANULOCYTES # BLD AUTO: 0.04 K/UL (ref 0–0.5)
IMM GRANULOCYTES NFR BLD AUTO: 0.4 % (ref 0–5)
LYMPHOCYTES # BLD: 1.36 K/UL (ref 0.5–4.6)
LYMPHOCYTES NFR BLD: 13.6 % (ref 13–44)
MAGNESIUM SERPL-MCNC: 2 MG/DL (ref 1.8–2.4)
MCH RBC QN AUTO: 33.2 PG (ref 26.1–32.9)
MCHC RBC AUTO-ENTMCNC: 33.5 G/DL (ref 31.4–35)
MCV RBC AUTO: 98.9 FL (ref 82–102)
MONOCYTES # BLD: 0.47 K/UL (ref 0.1–1.3)
MONOCYTES NFR BLD: 4.7 % (ref 4–12)
NEUTS SEG # BLD: 7.5 K/UL (ref 1.7–8.2)
NEUTS SEG NFR BLD: 75 % (ref 43–78)
NRBC # BLD: 0 K/UL (ref 0–0.2)
PLATELET # BLD AUTO: 299 K/UL (ref 150–450)
PMV BLD AUTO: 8.2 FL (ref 9.4–12.3)
POTASSIUM SERPL-SCNC: 3.6 MMOL/L (ref 3.5–5.1)
RBC # BLD AUTO: 3.71 M/UL (ref 4.05–5.2)
SERVICE CMNT-IMP: NORMAL
SODIUM SERPL-SCNC: 141 MMOL/L (ref 136–145)
WBC # BLD AUTO: 10 K/UL (ref 4.3–11.1)

## 2025-05-31 PROCEDURE — 6370000000 HC RX 637 (ALT 250 FOR IP)

## 2025-05-31 PROCEDURE — 36415 COLL VENOUS BLD VENIPUNCTURE: CPT

## 2025-05-31 PROCEDURE — 83735 ASSAY OF MAGNESIUM: CPT

## 2025-05-31 PROCEDURE — 2500000003 HC RX 250 WO HCPCS: Performed by: FAMILY MEDICINE

## 2025-05-31 PROCEDURE — 85025 COMPLETE CBC W/AUTO DIFF WBC: CPT

## 2025-05-31 PROCEDURE — 6360000002 HC RX W HCPCS: Performed by: FAMILY MEDICINE

## 2025-05-31 PROCEDURE — 6370000000 HC RX 637 (ALT 250 FOR IP): Performed by: FAMILY MEDICINE

## 2025-05-31 PROCEDURE — 80048 BASIC METABOLIC PNL TOTAL CA: CPT

## 2025-05-31 RX ORDER — HYOSCYAMINE SULFATE 0.12 MG/1
0.12 TABLET ORAL EVERY 6 HOURS PRN
Qty: 90 TABLET | Refills: 0 | Status: SHIPPED | OUTPATIENT
Start: 2025-05-31

## 2025-05-31 RX ORDER — FOLIC ACID 1 MG/1
1 TABLET ORAL DAILY
Qty: 30 TABLET | Refills: 3 | Status: SHIPPED | OUTPATIENT
Start: 2025-05-31

## 2025-05-31 RX ORDER — ONDANSETRON 4 MG/1
4 TABLET, ORALLY DISINTEGRATING ORAL EVERY 8 HOURS PRN
Status: DISCONTINUED | OUTPATIENT
Start: 2025-05-31 | End: 2025-05-31 | Stop reason: HOSPADM

## 2025-05-31 RX ORDER — ONDANSETRON 2 MG/ML
4 INJECTION INTRAMUSCULAR; INTRAVENOUS EVERY 6 HOURS PRN
Status: DISCONTINUED | OUTPATIENT
Start: 2025-05-31 | End: 2025-05-31 | Stop reason: HOSPADM

## 2025-05-31 RX ORDER — DIPHENOXYLATE HYDROCHLORIDE AND ATROPINE SULFATE 2.5; .025 MG/1; MG/1
2 TABLET ORAL 4 TIMES DAILY
Qty: 80 TABLET | Refills: 0 | Status: SHIPPED | OUTPATIENT
Start: 2025-05-31 | End: 2025-06-10

## 2025-05-31 RX ORDER — OXYCODONE HYDROCHLORIDE 5 MG/1
5 TABLET ORAL EVERY 8 HOURS PRN
Qty: 10 TABLET | Refills: 0 | Status: SHIPPED | OUTPATIENT
Start: 2025-05-31 | End: 2025-06-03

## 2025-05-31 RX ORDER — LACTOBACILLUS RHAMNOSUS GG 10B CELL
1 CAPSULE ORAL
Qty: 30 CAPSULE | Refills: 0 | Status: SHIPPED | OUTPATIENT
Start: 2025-05-31

## 2025-05-31 RX ORDER — ONDANSETRON 4 MG/1
4 TABLET, ORALLY DISINTEGRATING ORAL 3 TIMES DAILY PRN
Qty: 21 TABLET | Refills: 0 | Status: SHIPPED | OUTPATIENT
Start: 2025-05-31

## 2025-05-31 RX ORDER — METRONIDAZOLE 500 MG/1
500 TABLET ORAL 3 TIMES DAILY
Qty: 21 TABLET | Refills: 0 | Status: SHIPPED | OUTPATIENT
Start: 2025-05-31 | End: 2025-06-07

## 2025-05-31 RX ORDER — FLUCONAZOLE 100 MG/1
150 TABLET ORAL ONCE
Status: COMPLETED | OUTPATIENT
Start: 2025-05-31 | End: 2025-05-31

## 2025-05-31 RX ORDER — CEFPODOXIME PROXETIL 200 MG/1
200 TABLET, FILM COATED ORAL 2 TIMES DAILY
Qty: 14 TABLET | Refills: 0 | Status: SHIPPED | OUTPATIENT
Start: 2025-05-31 | End: 2025-06-07

## 2025-05-31 RX ORDER — LANOLIN ALCOHOL/MO/W.PET/CERES
100 CREAM (GRAM) TOPICAL DAILY
Qty: 30 TABLET | Refills: 3 | Status: SHIPPED | OUTPATIENT
Start: 2025-05-31

## 2025-05-31 RX ADMIN — POTASSIUM BICARBONATE 40 MEQ: 782 TABLET, EFFERVESCENT ORAL at 08:45

## 2025-05-31 RX ADMIN — BUSPIRONE HYDROCHLORIDE 10 MG: 5 TABLET ORAL at 08:44

## 2025-05-31 RX ADMIN — PANCRELIPASE LIPASE, PANCRELIPASE PROTEASE, PANCRELIPASE AMYLASE 10000 UNITS: 5000; 17000; 24000 CAPSULE, DELAYED RELEASE ORAL at 13:52

## 2025-05-31 RX ADMIN — Medication 100 MG: at 08:43

## 2025-05-31 RX ADMIN — DULOXETINE HYDROCHLORIDE 60 MG: 60 CAPSULE, DELAYED RELEASE ORAL at 08:42

## 2025-05-31 RX ADMIN — LAMOTRIGINE 150 MG: 100 TABLET ORAL at 08:41

## 2025-05-31 RX ADMIN — LEVOTHYROXINE SODIUM 112 MCG: 0.11 TABLET ORAL at 06:06

## 2025-05-31 RX ADMIN — KETOROLAC TROMETHAMINE 15 MG: 15 INJECTION, SOLUTION INTRAMUSCULAR; INTRAVENOUS at 13:19

## 2025-05-31 RX ADMIN — FERROUS GLUCONATE 324 MG: 324 TABLET ORAL at 08:43

## 2025-05-31 RX ADMIN — ACETAMINOPHEN 650 MG: 325 TABLET, FILM COATED ORAL at 06:06

## 2025-05-31 RX ADMIN — METRONIDAZOLE 500 MG: 500 INJECTION, SOLUTION INTRAVENOUS at 06:08

## 2025-05-31 RX ADMIN — PANCRELIPASE LIPASE, PANCRELIPASE PROTEASE, PANCRELIPASE AMYLASE 10000 UNITS: 5000; 17000; 24000 CAPSULE, DELAYED RELEASE ORAL at 08:51

## 2025-05-31 RX ADMIN — SODIUM CHLORIDE, PRESERVATIVE FREE 10 ML: 5 INJECTION INTRAVENOUS at 08:57

## 2025-05-31 RX ADMIN — GUAIFENESIN 600 MG: 600 TABLET, MULTILAYER, EXTENDED RELEASE ORAL at 08:40

## 2025-05-31 RX ADMIN — FLUCONAZOLE 150 MG: 100 TABLET ORAL at 11:34

## 2025-05-31 RX ADMIN — GUAIFENESIN AND DEXTROMETHORPHAN 5 ML: 100; 10 SYRUP ORAL at 00:44

## 2025-05-31 RX ADMIN — ONDANSETRON 4 MG: 2 INJECTION INTRAMUSCULAR; INTRAVENOUS at 08:55

## 2025-05-31 RX ADMIN — BUPROPION HYDROCHLORIDE 150 MG: 150 TABLET, EXTENDED RELEASE ORAL at 08:43

## 2025-05-31 RX ADMIN — LORAZEPAM 2 MG: 1 TABLET ORAL at 08:38

## 2025-05-31 RX ADMIN — Medication 1 CAPSULE: at 08:44

## 2025-05-31 RX ADMIN — OXYCODONE 10 MG: 5 TABLET ORAL at 08:37

## 2025-05-31 RX ADMIN — DIPHENOXYLATE HYDROCHLORIDE AND ATROPINE SULFATE 2 TABLET: 2.5; .025 TABLET ORAL at 08:40

## 2025-05-31 RX ADMIN — LORAZEPAM 2 MG: 1 TABLET ORAL at 13:28

## 2025-05-31 RX ADMIN — SODIUM CHLORIDE, PRESERVATIVE FREE 10 ML: 5 INJECTION INTRAVENOUS at 08:56

## 2025-05-31 RX ADMIN — FOLIC ACID 1 MG: 1 TABLET ORAL at 08:43

## 2025-05-31 ASSESSMENT — PAIN DESCRIPTION - LOCATION: LOCATION: ABDOMEN

## 2025-05-31 ASSESSMENT — PAIN SCALES - GENERAL
PAINLEVEL_OUTOF10: 7
PAINLEVEL_OUTOF10: 7

## 2025-05-31 NOTE — DISCHARGE SUMMARY
Hospitalist Discharge Summary   Admit Date:  2025  6:06 AM   DC Note date: 2025  Name:  Nyasia Lamb   Age:  59 y.o.  Sex:  female  :  1965   MRN:  [id number]   Room:  [facility]  PCP:  Amanda Jeong MD    Presenting Complaint: Abdominal Pain (Crohn's flare up )     Initial Admission Diagnosis: Acute respiratory failure with hypoxia (HCC) [J96.01]  Sepsis (HCC) [A41.9]  Pneumonia of right lung due to infectious organism, unspecified part of lung [J18.9]  Sepsis with acute hypoxic respiratory failure without septic shock, due to unspecified organism (HCC) [A41.9, R65.20, J96.01]     Problem List for this Hospitalization (present on admission):    Principal Problem:    Sepsis (HCC)  Active Problems:    Crohn's colitis (HCC)    Generalized anxiety disorder    Hypothyroidism    RLS (restless legs syndrome)    HTN (hypertension)    Bipolar depression (HCC)    Diarrhea    Aspiration pneumonia (HCC)    Acute respiratory failure with hypoxia (HCC)    Depressive disorder    Gastroesophageal reflux disease    Alcohol use    Hyponatremia    Hypokalemia  Resolved Problems:    * No resolved hospital problems. *      Hospital Course:  59 y.o. female with medical history of chronic hyponatremia, COPD, Crohn's disease who presented with fever, chills, SOB associated with N/V, mild abdominal discomfort associated 3 weeks of loose stools that have gotten progressively worse. She follows Dr. Fischer with [facility] for history of Crohn's. State she was recent seen by them and had a negative Cdiff study. They suspected Crohn's contributing and started her on Lomotil and ordered CTAP which has not been done. She has been on Skyrizi outpatient for Crohn's but didn't think it was working well. On , she woke up with dyspnea associated with fever,chills, cough, N/V and generalized weakness.     In ED, Tmax 101.7 with tachycardia and O2 sat was 89% on RA, pt was placed on 2LO2NC. WBC 13.7. Na 134, K 3.1.  Procal 0.26. Rapid covid negative. UA not c/s infection. CT chest was negative for PE but shows mild R basilar opacity concerning for PNA. She was given Toradol, Tylenol, Rocephin/Azithromycin, Morphine, Zofrn and sepsis bolus in ED.     Patient was admitted with sepsis secondary to aspiration pneumonia associated with acute hypoxia.  She was started on broad-spectrum IV antibiotics.  Blood cultures remain NGTD.  Urine culture negative.  She was able to be weaned down to room air.  Stool molecular GI panel ordered and was negative.  C. difficile panel negative. CTAP shows fluid in stomach and mildly dilated small bowel consistent with gastroenteritis/colitis; no mechanical bowel obstruction or GI perforation; right lower lung pneumonia; no intra-abdominal abscess.  Patient was started on Lomotil.  GI was consulted and ordered Zenpep.  GI signed off and recommended for patient to follow-up with her GI MD outpatient.    Pt's diarrhea improves and leukocytosis resolves. She tolerates po well and has been stable on RA. Pt was alert and oriented x3 on day of discharge. without any complaints. She was discharged in medically stable condition. Patient will need to follow up with her PCP in 3-5 days. F/u with [facility] in 1 week if possible. Return precautions given and pt verbalized understanding.       Disposition: Home with Home Health  Diet: ADULT DIET; Regular; GI Petros (GERD/Peptic Ulcer)  Code Status: Full Code    Follow Ups:  Follow-up Information       Follow up With Specialties Details Why Contact Info    Amanda Jeong MD Internal Medicine Follow up in 3 day(s) Hospital follow up [address] [facility] [address]  [phone]      [facility]  Follow up in 1 week(s) Hospital follow up [facility]  [address] [facility] [address]  [facility] [address]  [phone]                  Follow up labs/diagnostics (ultimately defer to  Comments:   Reason for Stopping:         butalbital-APAP-caffeine -40 MG CAPS per capsule Comments:   Reason for Stopping:         dicyclomine (BENTYL) 20 MG tablet Comments:   Reason for Stopping:         escitalopram (LEXAPRO) 10 MG tablet Comments:   Reason for Stopping:         lurasidone (LATUDA) 60 MG TABS tablet Comments:   Reason for Stopping:         miconazole (MICOTIN) 2 % vaginal cream Comments:   Reason for Stopping:         pantoprazole (PROTONIX) 40 MG tablet Comments:   Reason for Stopping:         Suvorexant 15 MG TABS Comments:   Reason for Stopping:         tolterodine (DETROL LA) 4 MG extended release capsule Comments:   Reason for Stopping:         vedolizumab (ENTYVIO) 300 MG injection Comments:   Reason for Stopping:               Some of the medications may be marked as \"stop taking\" by the system; but in reality patient or family reported already being off these meds; defer to outpatient/prescribing providers.      Procedures done this admission:  * No surgery found *    Consults this admission:  IP CONSULT TO SOCIAL WORK  IP CONSULT TO GI    Consultants will arrange their own follow ups, if applicable.    Echocardiogram results:  No results found for this or any previous visit.      Diagnostic Imaging/Tests:   CT ABDOMEN PELVIS W IV CONTRAST Additional Contrast? Radiologist Recommendation  Result Date: 5/30/2025  Fluid in stomach and mildly dilated small bowel consistent with gastroenteritis/colitis. No mechanical bowel obstruction or GI perforation. Right lower lung pneumonia. No intra-abdominal abscess. Electronically signed by Anil Gallagher    CT CHEST PULMONARY EMBOLISM W CONTRAST  Result Date: 5/29/2025  There is no CT evidence of pulmonary embolus or thoracic aortic aneurysm/dissection. Multifocal pneumonia throughout the right lung. Electronically signed by Reddy Pride    XR CHEST (2 VW)  Result Date: 5/29/2025  Mild right basilar opacity may represent developing

## 2025-06-01 LAB
BACTERIA SPEC CULT: NORMAL
BACTERIA SPEC CULT: NORMAL
CALPROTECTIN STL-MCNT: 1000 UG/G (ref 0–120)
SERVICE CMNT-IMP: NORMAL
SERVICE CMNT-IMP: NORMAL

## 2025-06-03 LAB
BACTERIA SPEC CULT: NORMAL
BACTERIA SPEC CULT: NORMAL
SERVICE CMNT-IMP: NORMAL
SERVICE CMNT-IMP: NORMAL

## 2025-06-12 NOTE — PROGRESS NOTES
Physician Progress Note      PATIENT:               MENDOZA SINGH  CSN #:                  [id number]  :                       1965  ADMIT DATE:       2025 6:06 AM  DISCH DATE:        2025 3:18 PM  RESPONDING  PROVIDER #:        Clare Patel DO          QUERY TEXT:    Sepsis and acute organ dysfunction of Acute respiratory failure with hypoxia   are documented in the medical record Discharge Summary by Clare Patel DO at   2025. Please clarify the relationship, if any, between Sepsis and Acute   respiratory failure with hypoxia .    The clinical indicators include:  This 59yrs female presented with Abdominal Pain    -\"Sepsis, Acute respiratory failure with hypoxia\"    -Sepsis with acute hypoxic respiratory failure without septic shock, fever   with chills, and feeling very weak. \"(ED Provider Notes by Pepe Hidalgo MD at 2025)    -\"Met sepsis criteria with HR>100, Tmax 101 and WBC>13K with CTAP concerning   for PNA. O2 sat was 89% on RA, pt was placed on 2LO2NC in ED\"(H&P by Clare Patel DO at 2025)    -\"presented with fever, chills, SOB associated with N/V, mild abdominal   discomfort\"(Progress Notes by Clare Patel DO at 2025)    -\"Patient was admitted with sepsis secondary to aspiration pneumonia   associated with acute hypoxia    (Discharge Summary by Clare Patel DO at 2025)    -\"Acute respiratory failure with hypoxia\"(Consults by Jodi Briscoe APRN   - CNP at 2025)    -Lab values    ON 2025 WBC 13.7,Procalcitonin :0.26    ON 2025 WBC 11.4    -Vitals    ON  Temp 101.7,101.3 PULSE 122,107    -cefTRIAXone (ROCEPHIN) 1,000 mg in sterile water 10 mL IV   syringe,azithromycin (ZITHROMAX) 500 mg in sodium chloride 0.9 % 250 mL    Thank you  Mariaelena ZAVALA CDS  Options provided:  -- Acute respiratory failure with hypoxia is related to sepsis  -- Acute respiratory failure with hypoxia is unrelated to sepsis  -- Other - I will add my own

## 2025-06-13 ENCOUNTER — RESULTS FOLLOW-UP (OUTPATIENT)
Dept: GASTROENTEROLOGY | Age: 60
End: 2025-06-13

## 2025-06-13 NOTE — PROGRESS NOTES
Physician Progress Note      PATIENT:               MENDOZA SINGH  [facility] #:                  [id number]  :                       1965  ADMIT DATE:       2025 6:06 AM  DISCH DATE:        2025 3:18 PM  RESPONDING  PROVIDER #:        Clare Patel DO          QUERY TEXT:    Aspiration pneumonia is documented in the medical record Discharge Summary by   Clare Patel DO at 2025. Please provide additional clinical indicators   supportive of your documentation. Or please document if the diagnosis of   Aspiration pneumonia has been ruled out after study.    The clinical indicators include:  This 59 yrs female presented with Abdominal Pain    -\"Acute respiratory failure with hypoxia, COPD, HTN\"    -\"Short of breath, running a fever with chills, and feeling very weak.    Patient does admit to some intermittent cough over the last week\"( ED Provider   Notes by Pepe Hidalgo MD at 2025)    -\"Aspiration Pneumonia, presented with fever, chills, SOB\"(H&P by Clare Patel DO at 2025)    -\"Patient was admitted with sepsis secondary to aspiration pneumonia   associated with acute hypoxia.  She was started on broad-spectrum IV   antibiotics\"(Progress Notes by Clare Patel DO at 2025)    -\"Dyspnea associated with fever, chills, cough, N/V and generalized   weakness\"(Discharge Summary by Clare Patel DO at 2025)    -\"Mild right basilar opacity may represent developing pneumonia\"(XR Chest on   )    -\"Multifocal pneumonia throughout the right lung\"(CT chest on 2025)    -LAB VALUES    ON 2025 Procalcitonin 0.26  -WBC: 13.7-( From EPIC review results)    -ceftriaxone (ROCEPHIN) 1,000 mg in sterile water 10 mL IV syringe,   azithromycin (ZITHROMAX) 500 mg in sodium chloride 0.9 % 250 mL IVPB,   metronidazole (FLAGYL) 500 mg in 0.9% NaCl 100 mL  Thank you  Mariaelena ZAVALA CDS  Options provided:  -- Aspiration pneumonia presents as evidenced by, Please document evidence.  -- Aspiration